# Patient Record
Sex: FEMALE | Race: BLACK OR AFRICAN AMERICAN | Employment: OTHER | URBAN - METROPOLITAN AREA
[De-identification: names, ages, dates, MRNs, and addresses within clinical notes are randomized per-mention and may not be internally consistent; named-entity substitution may affect disease eponyms.]

---

## 2019-02-22 ENCOUNTER — APPOINTMENT (OUTPATIENT)
Dept: CT IMAGING | Age: 79
DRG: 193 | End: 2019-02-22
Attending: EMERGENCY MEDICINE
Payer: MEDICARE

## 2019-02-22 ENCOUNTER — APPOINTMENT (OUTPATIENT)
Dept: NON INVASIVE DIAGNOSTICS | Age: 79
DRG: 193 | End: 2019-02-22
Attending: INTERNAL MEDICINE
Payer: MEDICARE

## 2019-02-22 ENCOUNTER — HOSPITAL ENCOUNTER (INPATIENT)
Age: 79
LOS: 8 days | Discharge: HOME HEALTH | DRG: 193 | End: 2019-03-02
Attending: EMERGENCY MEDICINE | Admitting: INTERNAL MEDICINE
Payer: MEDICARE

## 2019-02-22 ENCOUNTER — APPOINTMENT (OUTPATIENT)
Dept: GENERAL RADIOLOGY | Age: 79
DRG: 193 | End: 2019-02-22
Attending: STUDENT IN AN ORGANIZED HEALTH CARE EDUCATION/TRAINING PROGRAM
Payer: MEDICARE

## 2019-02-22 DIAGNOSIS — I48.20 CHRONIC A-FIB (HCC): ICD-10-CM

## 2019-02-22 DIAGNOSIS — E87.70 HYPERVOLEMIA, UNSPECIFIED HYPERVOLEMIA TYPE: ICD-10-CM

## 2019-02-22 DIAGNOSIS — I48.91 ATRIAL FIBRILLATION WITH RAPID VENTRICULAR RESPONSE (HCC): ICD-10-CM

## 2019-02-22 DIAGNOSIS — J96.01 ACUTE RESPIRATORY FAILURE WITH HYPOXIA (HCC): Primary | ICD-10-CM

## 2019-02-22 DIAGNOSIS — R60.9 PERIPHERAL EDEMA: ICD-10-CM

## 2019-02-22 DIAGNOSIS — J18.9 COMMUNITY ACQUIRED PNEUMONIA, UNSPECIFIED LATERALITY: ICD-10-CM

## 2019-02-22 PROBLEM — I50.9 CHF (CONGESTIVE HEART FAILURE) (HCC): Status: ACTIVE | Noted: 2019-02-22

## 2019-02-22 LAB
ALBUMIN SERPL-MCNC: 3.6 G/DL (ref 3.5–5)
ALBUMIN/GLOB SERPL: 0.8 {RATIO} (ref 1.1–2.2)
ALP SERPL-CCNC: 76 U/L (ref 45–117)
ALT SERPL-CCNC: 22 U/L (ref 12–78)
ANION GAP SERPL CALC-SCNC: 9 MMOL/L (ref 5–15)
AST SERPL-CCNC: 34 U/L (ref 15–37)
ATRIAL RATE: 88 BPM
BASOPHILS # BLD: 0 K/UL (ref 0–0.1)
BASOPHILS NFR BLD: 1 % (ref 0–1)
BILIRUB SERPL-MCNC: 1.7 MG/DL (ref 0.2–1)
BNP SERPL-MCNC: 1401 PG/ML
BUN SERPL-MCNC: 11 MG/DL (ref 6–20)
BUN/CREAT SERPL: 13 (ref 12–20)
CALCIUM SERPL-MCNC: 8.3 MG/DL (ref 8.5–10.1)
CALCULATED R AXIS, ECG10: 20 DEGREES
CALCULATED T AXIS, ECG11: 6 DEGREES
CHLORIDE SERPL-SCNC: 99 MMOL/L (ref 97–108)
CK MB CFR SERPL CALC: 0.9 % (ref 0–2.5)
CK MB SERPL-MCNC: 2 NG/ML (ref 5–25)
CK SERPL-CCNC: 232 U/L (ref 26–192)
CO2 SERPL-SCNC: 26 MMOL/L (ref 21–32)
COMMENT, HOLDF: NORMAL
CREAT SERPL-MCNC: 0.84 MG/DL (ref 0.55–1.02)
DIAGNOSIS, 93000: NORMAL
DIFFERENTIAL METHOD BLD: NORMAL
ECHO AO ROOT DIAM: 3.4 CM
ECHO AV AREA PEAK VELOCITY: 0.8 CM2
ECHO AV AREA VTI: 0.6 CM2
ECHO AV AREA/BSA PEAK VELOCITY: 0.4 CM2/M2
ECHO AV AREA/BSA VTI: 0.3 CM2/M2
ECHO AV MEAN GRADIENT: 15 MMHG
ECHO AV PEAK GRADIENT: 25.5 MMHG
ECHO AV PEAK VELOCITY: 252.65 CM/S
ECHO AV REGURGITANT PHT: 588.9 CM
ECHO AV VTI: 70.42 CM
ECHO EST RA PRESSURE: 10 MMHG
ECHO LV INTERNAL DIMENSION DIASTOLIC: 3.76 CM (ref 3.9–5.3)
ECHO LV INTERNAL DIMENSION SYSTOLIC: 3.08 CM
ECHO LV IVSD: 1.53 CM (ref 0.6–0.9)
ECHO LV MASS 2D: 232.8 G (ref 67–162)
ECHO LV MASS INDEX 2D: 111.6 G/M2 (ref 43–95)
ECHO LV POSTERIOR WALL DIASTOLIC: 1.33 CM (ref 0.6–0.9)
ECHO LV POSTERIOR WALL SYSTOLIC: 1.28 CM
ECHO LVOT DIAM: 1.64 CM
ECHO LVOT PEAK GRADIENT: 3.3 MMHG
ECHO LVOT PEAK VELOCITY: 90.64 CM/S
ECHO LVOT SV: 44.2 ML
ECHO LVOT VTI: 20.99 CM
ECHO MV A VELOCITY: 55.74 CM/S
ECHO MV AREA PHT: 3.6 CM2
ECHO MV E DECELERATION TIME (DT): 213.4 MS
ECHO MV E VELOCITY: 1.85 CM/S
ECHO MV E/A RATIO: 0.03
ECHO MV PRESSURE HALF TIME (PHT): 61.9 MS
ECHO PULMONARY ARTERY SYSTOLIC PRESSURE (PASP): 45.5 MMHG
ECHO RIGHT VENTRICULAR SYSTOLIC PRESSURE (RVSP): 45.5 MMHG
ECHO TV MAX VELOCITY: 280.46 CM/S
ECHO TV PEAK GRADIENT: 31.5 MMHG
ECHO TV REGURGITANT MAX VELOCITY: 298.11 CM/S
ECHO TV REGURGITANT PEAK GRADIENT: 35.5 MMHG
EOSINOPHIL # BLD: 0 K/UL (ref 0–0.4)
EOSINOPHIL NFR BLD: 0 % (ref 0–7)
ERYTHROCYTE [DISTWIDTH] IN BLOOD BY AUTOMATED COUNT: 14 % (ref 11.5–14.5)
GLOBULIN SER CALC-MCNC: 4.6 G/DL (ref 2–4)
GLUCOSE BLD STRIP.AUTO-MCNC: 152 MG/DL (ref 65–100)
GLUCOSE BLD STRIP.AUTO-MCNC: 190 MG/DL (ref 65–100)
GLUCOSE BLD STRIP.AUTO-MCNC: 220 MG/DL (ref 65–100)
GLUCOSE BLD STRIP.AUTO-MCNC: 237 MG/DL (ref 65–100)
GLUCOSE BLD STRIP.AUTO-MCNC: 243 MG/DL (ref 65–100)
GLUCOSE SERPL-MCNC: 232 MG/DL (ref 65–100)
HCT VFR BLD AUTO: 35.4 % (ref 35–47)
HGB BLD-MCNC: 11.7 G/DL (ref 11.5–16)
IMM GRANULOCYTES # BLD AUTO: 0 K/UL (ref 0–0.04)
IMM GRANULOCYTES NFR BLD AUTO: 0 % (ref 0–0.5)
LACTATE BLD-SCNC: 1.36 MMOL/L (ref 0.4–2)
LACTATE SERPL-SCNC: 1.5 MMOL/L (ref 0.4–2)
LYMPHOCYTES # BLD: 1.4 K/UL (ref 0.8–3.5)
LYMPHOCYTES NFR BLD: 21 % (ref 12–49)
MCH RBC QN AUTO: 30.2 PG (ref 26–34)
MCHC RBC AUTO-ENTMCNC: 33.1 G/DL (ref 30–36.5)
MCV RBC AUTO: 91.5 FL (ref 80–99)
MONOCYTES # BLD: 0.6 K/UL (ref 0–1)
MONOCYTES NFR BLD: 8 % (ref 5–13)
NEUTS SEG # BLD: 4.8 K/UL (ref 1.8–8)
NEUTS SEG NFR BLD: 70 % (ref 32–75)
NRBC # BLD: 0 K/UL (ref 0–0.01)
NRBC BLD-RTO: 0 PER 100 WBC
PISA AR MAX VEL: 393.61 CM/S
PLATELET # BLD AUTO: 191 K/UL (ref 150–400)
PMV BLD AUTO: 9.5 FL (ref 8.9–12.9)
POTASSIUM SERPL-SCNC: 3.4 MMOL/L (ref 3.5–5.1)
PROT SERPL-MCNC: 8.2 G/DL (ref 6.4–8.2)
Q-T INTERVAL, ECG07: 346 MS
QRS DURATION, ECG06: 88 MS
QTC CALCULATION (BEZET), ECG08: 474 MS
RBC # BLD AUTO: 3.87 M/UL (ref 3.8–5.2)
SAMPLES BEING HELD,HOLD: NORMAL
SERVICE CMNT-IMP: ABNORMAL
SODIUM SERPL-SCNC: 134 MMOL/L (ref 136–145)
TROPONIN I SERPL-MCNC: <0.05 NG/ML
TROPONIN I SERPL-MCNC: <0.05 NG/ML
TSH SERPL DL<=0.05 MIU/L-ACNC: 6.08 UIU/ML (ref 0.36–3.74)
VENTRICULAR RATE, ECG03: 113 BPM
WBC # BLD AUTO: 6.9 K/UL (ref 3.6–11)

## 2019-02-22 PROCEDURE — 83880 ASSAY OF NATRIURETIC PEPTIDE: CPT

## 2019-02-22 PROCEDURE — 74011250636 HC RX REV CODE- 250/636: Performed by: INTERNAL MEDICINE

## 2019-02-22 PROCEDURE — 82550 ASSAY OF CK (CPK): CPT

## 2019-02-22 PROCEDURE — 97161 PT EVAL LOW COMPLEX 20 MIN: CPT

## 2019-02-22 PROCEDURE — 74011636320 HC RX REV CODE- 636/320: Performed by: EMERGENCY MEDICINE

## 2019-02-22 PROCEDURE — 65660000000 HC RM CCU STEPDOWN

## 2019-02-22 PROCEDURE — 74011250637 HC RX REV CODE- 250/637: Performed by: INTERNAL MEDICINE

## 2019-02-22 PROCEDURE — 74011636637 HC RX REV CODE- 636/637: Performed by: INTERNAL MEDICINE

## 2019-02-22 PROCEDURE — 85025 COMPLETE CBC W/AUTO DIFF WBC: CPT

## 2019-02-22 PROCEDURE — 74011000250 HC RX REV CODE- 250: Performed by: STUDENT IN AN ORGANIZED HEALTH CARE EDUCATION/TRAINING PROGRAM

## 2019-02-22 PROCEDURE — 74011000250 HC RX REV CODE- 250: Performed by: INTERNAL MEDICINE

## 2019-02-22 PROCEDURE — 84484 ASSAY OF TROPONIN QUANT: CPT

## 2019-02-22 PROCEDURE — 36415 COLL VENOUS BLD VENIPUNCTURE: CPT

## 2019-02-22 PROCEDURE — 97530 THERAPEUTIC ACTIVITIES: CPT

## 2019-02-22 PROCEDURE — 96375 TX/PRO/DX INJ NEW DRUG ADDON: CPT

## 2019-02-22 PROCEDURE — 74011250636 HC RX REV CODE- 250/636: Performed by: STUDENT IN AN ORGANIZED HEALTH CARE EDUCATION/TRAINING PROGRAM

## 2019-02-22 PROCEDURE — 77010033678 HC OXYGEN DAILY

## 2019-02-22 PROCEDURE — 87070 CULTURE OTHR SPECIMN AEROBIC: CPT

## 2019-02-22 PROCEDURE — 74011000258 HC RX REV CODE- 258: Performed by: INTERNAL MEDICINE

## 2019-02-22 PROCEDURE — 93005 ELECTROCARDIOGRAM TRACING: CPT

## 2019-02-22 PROCEDURE — 83605 ASSAY OF LACTIC ACID: CPT

## 2019-02-22 PROCEDURE — 96374 THER/PROPH/DIAG INJ IV PUSH: CPT

## 2019-02-22 PROCEDURE — 93306 TTE W/DOPPLER COMPLETE: CPT

## 2019-02-22 PROCEDURE — 77030038269 HC DRN EXT URIN PURWCK BARD -A

## 2019-02-22 PROCEDURE — 82553 CREATINE MB FRACTION: CPT

## 2019-02-22 PROCEDURE — 80053 COMPREHEN METABOLIC PANEL: CPT

## 2019-02-22 PROCEDURE — 71275 CT ANGIOGRAPHY CHEST: CPT

## 2019-02-22 PROCEDURE — 82962 GLUCOSE BLOOD TEST: CPT

## 2019-02-22 PROCEDURE — 71045 X-RAY EXAM CHEST 1 VIEW: CPT

## 2019-02-22 PROCEDURE — 84443 ASSAY THYROID STIM HORMONE: CPT

## 2019-02-22 PROCEDURE — 99284 EMERGENCY DEPT VISIT MOD MDM: CPT

## 2019-02-22 PROCEDURE — 87040 BLOOD CULTURE FOR BACTERIA: CPT

## 2019-02-22 PROCEDURE — 74011250636 HC RX REV CODE- 250/636: Performed by: EMERGENCY MEDICINE

## 2019-02-22 PROCEDURE — 94761 N-INVAS EAR/PLS OXIMETRY MLT: CPT

## 2019-02-22 RX ORDER — SODIUM CHLORIDE 0.9 % (FLUSH) 0.9 %
10 SYRINGE (ML) INJECTION
Status: COMPLETED | OUTPATIENT
Start: 2019-02-22 | End: 2019-02-22

## 2019-02-22 RX ORDER — SODIUM CHLORIDE 0.9 % (FLUSH) 0.9 %
5-40 SYRINGE (ML) INJECTION EVERY 8 HOURS
Status: DISCONTINUED | OUTPATIENT
Start: 2019-02-22 | End: 2019-03-02 | Stop reason: HOSPADM

## 2019-02-22 RX ORDER — BISACODYL 5 MG
5 TABLET, DELAYED RELEASE (ENTERIC COATED) ORAL DAILY PRN
Status: DISCONTINUED | OUTPATIENT
Start: 2019-02-22 | End: 2019-03-02 | Stop reason: HOSPADM

## 2019-02-22 RX ORDER — ENOXAPARIN SODIUM 100 MG/ML
40 INJECTION SUBCUTANEOUS EVERY 24 HOURS
Status: DISCONTINUED | OUTPATIENT
Start: 2019-02-23 | End: 2019-02-22

## 2019-02-22 RX ORDER — AMLODIPINE BESYLATE 5 MG/1
5 TABLET ORAL DAILY
COMMUNITY
End: 2019-03-02

## 2019-02-22 RX ORDER — ACETAMINOPHEN 500 MG
2000 TABLET ORAL DAILY
COMMUNITY

## 2019-02-22 RX ORDER — FUROSEMIDE 10 MG/ML
40 INJECTION INTRAMUSCULAR; INTRAVENOUS
Status: COMPLETED | OUTPATIENT
Start: 2019-02-22 | End: 2019-02-22

## 2019-02-22 RX ORDER — FUROSEMIDE 10 MG/ML
40 INJECTION INTRAMUSCULAR; INTRAVENOUS DAILY
Status: DISCONTINUED | OUTPATIENT
Start: 2019-02-23 | End: 2019-02-23

## 2019-02-22 RX ORDER — METOPROLOL TARTRATE 50 MG/1
75 TABLET ORAL 2 TIMES DAILY
COMMUNITY
End: 2019-03-02

## 2019-02-22 RX ORDER — LOSARTAN POTASSIUM 100 MG/1
100 TABLET ORAL DAILY
Status: DISCONTINUED | OUTPATIENT
Start: 2019-02-22 | End: 2019-03-02 | Stop reason: HOSPADM

## 2019-02-22 RX ORDER — ATORVASTATIN CALCIUM 10 MG/1
40 TABLET, FILM COATED ORAL EVERY OTHER DAY
Status: DISCONTINUED | OUTPATIENT
Start: 2019-02-23 | End: 2019-03-02 | Stop reason: HOSPADM

## 2019-02-22 RX ORDER — ASPIRIN 81 MG/1
81 TABLET ORAL DAILY
Status: DISCONTINUED | OUTPATIENT
Start: 2019-02-22 | End: 2019-03-02 | Stop reason: HOSPADM

## 2019-02-22 RX ORDER — GUANFACINE HYDROCHLORIDE 1 MG/1
2 TABLET ORAL
Status: DISCONTINUED | OUTPATIENT
Start: 2019-02-22 | End: 2019-03-02 | Stop reason: HOSPADM

## 2019-02-22 RX ORDER — PANTOPRAZOLE SODIUM 40 MG/1
40 TABLET, DELAYED RELEASE ORAL
Status: DISCONTINUED | OUTPATIENT
Start: 2019-02-22 | End: 2019-03-02 | Stop reason: HOSPADM

## 2019-02-22 RX ORDER — LEVOTHYROXINE SODIUM 125 UG/1
125 TABLET ORAL
Status: DISCONTINUED | OUTPATIENT
Start: 2019-02-22 | End: 2019-03-02 | Stop reason: HOSPADM

## 2019-02-22 RX ORDER — MAGNESIUM SULFATE 100 %
4 CRYSTALS MISCELLANEOUS AS NEEDED
Status: DISCONTINUED | OUTPATIENT
Start: 2019-02-22 | End: 2019-03-02 | Stop reason: HOSPADM

## 2019-02-22 RX ORDER — ACETAMINOPHEN 325 MG/1
650 TABLET ORAL
Status: DISCONTINUED | OUTPATIENT
Start: 2019-02-22 | End: 2019-03-02 | Stop reason: HOSPADM

## 2019-02-22 RX ORDER — METOPROLOL TARTRATE 5 MG/5ML
INJECTION INTRAVENOUS
Status: DISPENSED
Start: 2019-02-22 | End: 2019-02-22

## 2019-02-22 RX ORDER — FUROSEMIDE 40 MG/1
40 TABLET ORAL DAILY
COMMUNITY

## 2019-02-22 RX ORDER — ONDANSETRON 2 MG/ML
4 INJECTION INTRAMUSCULAR; INTRAVENOUS
Status: DISCONTINUED | OUTPATIENT
Start: 2019-02-22 | End: 2019-03-02 | Stop reason: HOSPADM

## 2019-02-22 RX ORDER — METOPROLOL TARTRATE 5 MG/5ML
5 INJECTION INTRAVENOUS ONCE
Status: COMPLETED | OUTPATIENT
Start: 2019-02-22 | End: 2019-02-22

## 2019-02-22 RX ORDER — MULTIVITAMIN
1 TABLET ORAL 2 TIMES DAILY
COMMUNITY

## 2019-02-22 RX ORDER — CLONIDINE HYDROCHLORIDE 0.1 MG/1
0.15 TABLET ORAL 2 TIMES DAILY
COMMUNITY
End: 2019-03-02

## 2019-02-22 RX ORDER — ENOXAPARIN SODIUM 100 MG/ML
1 INJECTION SUBCUTANEOUS
Status: COMPLETED | OUTPATIENT
Start: 2019-02-22 | End: 2019-02-22

## 2019-02-22 RX ORDER — HYDROCODONE BITARTRATE AND ACETAMINOPHEN 5; 325 MG/1; MG/1
0.5 TABLET ORAL
Status: DISCONTINUED | OUTPATIENT
Start: 2019-02-22 | End: 2019-03-02 | Stop reason: HOSPADM

## 2019-02-22 RX ORDER — ASPIRIN 325 MG
325 TABLET ORAL DAILY
COMMUNITY

## 2019-02-22 RX ORDER — INSULIN LISPRO 100 [IU]/ML
INJECTION, SOLUTION INTRAVENOUS; SUBCUTANEOUS
Status: DISCONTINUED | OUTPATIENT
Start: 2019-02-22 | End: 2019-03-02 | Stop reason: HOSPADM

## 2019-02-22 RX ORDER — SODIUM CHLORIDE 0.9 % (FLUSH) 0.9 %
5-40 SYRINGE (ML) INJECTION AS NEEDED
Status: DISCONTINUED | OUTPATIENT
Start: 2019-02-22 | End: 2019-03-02 | Stop reason: HOSPADM

## 2019-02-22 RX ORDER — PRAVASTATIN SODIUM 80 MG/1
80 TABLET ORAL
COMMUNITY

## 2019-02-22 RX ORDER — POTASSIUM CHLORIDE 750 MG/1
40 TABLET, FILM COATED, EXTENDED RELEASE ORAL ONCE
Status: COMPLETED | OUTPATIENT
Start: 2019-02-22 | End: 2019-02-22

## 2019-02-22 RX ORDER — DEXTROSE 50 % IN WATER (D50W) INTRAVENOUS SYRINGE
25-50 AS NEEDED
Status: DISCONTINUED | OUTPATIENT
Start: 2019-02-22 | End: 2019-03-02 | Stop reason: HOSPADM

## 2019-02-22 RX ADMIN — CEFTRIAXONE SODIUM 1 G: 1 INJECTION, POWDER, FOR SOLUTION INTRAMUSCULAR; INTRAVENOUS at 04:50

## 2019-02-22 RX ADMIN — AZITHROMYCIN MONOHYDRATE 500 MG: 500 INJECTION, POWDER, LYOPHILIZED, FOR SOLUTION INTRAVENOUS at 05:56

## 2019-02-22 RX ADMIN — Medication 10 ML: at 02:57

## 2019-02-22 RX ADMIN — LEVOTHYROXINE SODIUM 125 MCG: 125 TABLET ORAL at 08:34

## 2019-02-22 RX ADMIN — Medication 10 ML: at 08:39

## 2019-02-22 RX ADMIN — DILTIAZEM HYDROCHLORIDE 10 MG/HR: 5 INJECTION INTRAVENOUS at 19:53

## 2019-02-22 RX ADMIN — METOPROLOL TARTRATE 5 MG: 5 INJECTION INTRAVENOUS at 01:18

## 2019-02-22 RX ADMIN — INSULIN LISPRO 2 UNITS: 100 INJECTION, SOLUTION INTRAVENOUS; SUBCUTANEOUS at 22:38

## 2019-02-22 RX ADMIN — FUROSEMIDE 40 MG: 10 INJECTION, SOLUTION INTRAMUSCULAR; INTRAVENOUS at 02:45

## 2019-02-22 RX ADMIN — IOPAMIDOL 100 ML: 755 INJECTION, SOLUTION INTRAVENOUS at 02:57

## 2019-02-22 RX ADMIN — INSULIN LISPRO 2 UNITS: 100 INJECTION, SOLUTION INTRAVENOUS; SUBCUTANEOUS at 18:23

## 2019-02-22 RX ADMIN — INSULIN LISPRO 3 UNITS: 100 INJECTION, SOLUTION INTRAVENOUS; SUBCUTANEOUS at 13:23

## 2019-02-22 RX ADMIN — LOSARTAN POTASSIUM 100 MG: 100 TABLET, FILM COATED ORAL at 08:34

## 2019-02-22 RX ADMIN — ENOXAPARIN SODIUM 100 MG: 100 INJECTION SUBCUTANEOUS at 06:41

## 2019-02-22 RX ADMIN — POTASSIUM CHLORIDE 40 MEQ: 750 TABLET, EXTENDED RELEASE ORAL at 04:48

## 2019-02-22 RX ADMIN — INSULIN LISPRO 3 UNITS: 100 INJECTION, SOLUTION INTRAVENOUS; SUBCUTANEOUS at 08:35

## 2019-02-22 RX ADMIN — PANTOPRAZOLE SODIUM 40 MG: 40 TABLET, DELAYED RELEASE ORAL at 08:34

## 2019-02-22 RX ADMIN — DILTIAZEM HYDROCHLORIDE 2.5 MG/HR: 5 INJECTION INTRAVENOUS at 06:35

## 2019-02-22 RX ADMIN — ASPIRIN 81 MG: 81 TABLET, COATED ORAL at 10:22

## 2019-02-22 RX ADMIN — Medication 10 ML: at 22:39

## 2019-02-22 RX ADMIN — GUANFACINE HYDROCHLORIDE 2 MG: 1 TABLET ORAL at 22:38

## 2019-02-22 NOTE — H&P
Hospitalist Admission NoteNAME: Yemi :  1940 MRN:  [id number] Date/Time:  2019 5:35 AM 
 
Patient PCP: Unknown, Provider 
______________________________________________________________________ Given the patient's current clinical presentation, I have a high level of concern for decompensation if discharged from the emergency department. Complex decision making was performed, which includes reviewing the patient's available past medical records, laboratory results, and x-ray films. My assessment of this patient's clinical condition and my plan of care is as follows. Assessment / Plan: 
 
Acute hypoxic respiratory failure most likely secondary to CHF exacerbation and superimposed community-acquired pneumonia Admit patient to telemetry unit [facility] Daily weight Start patient on IV antibiotic azithromycin and Rocephin Follow-up sputum culture Start patient on Lasix 40 mg IV daily Echocardiogram 
Cardiology consultation Follow-up serial troponin DM Hold oral hypoglycemic medication start patient sliding scale with insulin Hypertension Continue home antihypertensive medication Cozaar 100 mg daily Hypothyroidism Continue Synthyroid 125 mcg daily Hyperlipidemia Continue simvastatin 80 mg daily AF with RVR  
-give one dose Lovenox  
-cardio consult  
-start on Cardizem drip Code Status: Full Surrogate Decision Maker: 
Abdullahi Fernández DVT Prophylaxis: Lovenox GI Prophylaxis: not indicated Baseline: independent Subjective: CHIEF COMPLAINT: SOB HISTORY OF PRESENT ILLNESS:    
 
77years old female from home with past medical history significant for DM, hypertension, hypothyroidism, atrial fibrillation presented to the hospital complaining from worsening shortness of breath started about 2 weeks ago associated with a productive cough with brownish colored sputum associated with worsening bilateral leg swelling, patient denies any fever but states that she feels chills, denies any chest pain CT chest was done on show bilateral pneumonia and reactive mediastinal lymphadenopathy and  mild interstitial edema, 1 dose 40 mg Lasix was given in ED and  Rocephin and azithromycin was given. We were asked to admit for work up and evaluation of the above problems. Past Medical History:  
Diagnosis Date  Diabetes ([address]  Hypercholesteremia  Hypertension  Thyroid condition Past Surgical History:  
Procedure Laterality Date  HX HYSTERECTOMY Social History Tobacco Use  Smoking status: Never Smoker Substance Use Topics  Alcohol use: No  
  
 
No family history on file. Allergies Allergen Reactions  Lisinopril Swelling Prior to Admission medications Medication Sig Start Date End Date Taking? Authorizing Provider  
metFORMIN (GLUCOPHAGE) 850 mg tablet Take 850 mg by mouth two (2) times daily (with meals). Dick Conroy MD  
levothyroxine (SYNTHROID) 125 mcg tablet Take 125 mcg by mouth Daily (before breakfast). Dick Conroy MD  
losartan (COZAAR) 100 mg tablet Take 100 mg by mouth daily. Dick Conroy MD  
guanFACINE 2 mg Tab Take 2 mg by mouth nightly. Dick Conroy MD  
glimepiride (AMARYL) 4 mg tablet Take 4 mg by mouth nightly. Dick Conroy MD  
simvastatin (ZOCOR) 80 mg tablet Take 80 mg by mouth every other day. Dick Conroy MD  
HYDROcodone-acetaminophen (LORTAB) 2.5-500 mg per tablet Take 1 Tab by mouth every six (6) hours as needed for Pain. 2/16/13   Loyd Das MD  
aspirin 81 mg CpDR Take 81 mg by mouth daily. 2/16/13   Loyd Das MD  
omeprazole (PRILOSEC) 20 mg capsule Take 1 Cap by mouth daily. 3/14/12   Dora Walls MD  
 
 
REVIEW OF SYSTEMS:    
I am not able to complete the review of systems because: The patient is intubated and sedated The patient has altered mental status due to his acute medical problems The patient has baseline aphasia from prior stroke(s) The patient has baseline dementia and is not reliable historian The patient is in acute medical distress and unable to provide information Total of 12 systems reviewed as follows:   
   POSITIVE= underlined text  Negative = text not underlined General:  fever, chills, sweats, generalized weakness, weight loss/gain,  
   loss of appetite Eyes:    blurred vision, eye pain, loss of vision, double vision ENT:    rhinorrhea, pharyngitis Respiratory:   cough, sputum production, SOB, STARR, wheezing, pleuritic pain  
Cardiology:   chest pain, palpitations, orthopnea, PND, edema, syncope Gastrointestinal:  abdominal pain , N/V, diarrhea, dysphagia, constipation, bleeding Genitourinary:  frequency, urgency, dysuria, hematuria, incontinence Muskuloskeletal :  arthralgia, myalgia, back pain Hematology:  easy bruising, nose or gum bleeding, lymphadenopathy Dermatological: rash, ulceration, pruritis, color change / jaundice Endocrine:   hot flashes or polydipsia Neurological:  headache, dizziness, confusion, focal weakness, paresthesia, Speech difficulties, memory loss, gait difficulty Psychological: Feelings of anxiety, depression, agitation Objective: VITALS:   
Visit Vitals /88 Pulse (!) 101 Temp 98.5 °F (36.9 °C) Resp 20 Ht 5' 8\" (1.727 m) Wt 95.3 kg (210 lb) SpO2 94% BMI 31.93 kg/m² PHYSICAL EXAM: 
 
General:    Alert, cooperative, no distress, appears stated age. HEENT: Atraumatic, anicteric sclerae, pink conjunctivae No oral ulcers, mucosa moist, throat clear, dentition fair Neck:  Supple, symmetrical,  thyroid: non tender Lungs:   B/l  Rhonchi. Chest wall:  No tenderness  No Accessory muscle use. Heart:   IRRegular  rhythm,  No  murmur   B/l edema Abdomen:   Soft, non-tender. Not distended. Bowel sounds normal 
Extremities: No cyanosis. No clubbing,   
  Skin turgor normal, Capillary refill normal, Radial dial pulse 2+ Skin:     Not pale. Not Jaundiced  No rashes Psych:  Good insight. Not depressed. Not anxious or agitated. Neurologic: EOMs intact. No facial asymmetry. No aphasia or slurred speech. Symmetrical strength, Sensation grossly intact. Alert and oriented X 4.  
 
_______________________________________________________________________ Care Plan discussed with: 
  Comments Patient y Family RN y   
Care Manager Consultant:     
_______________________________________________________________________ Expected  Disposition:  
Home with Family y HH/PT/OT/RN   
SNF/LTC   
SHAYY   
________________________________________________________________________ TOTAL TIME:  70  Minutes Critical Care Provided     Minutes non procedure based Comments  
 y Reviewed previous records  
>50% of visit spent in counseling and coordination of care y Discussion with patient and/or family and questions answered 
  
 
________________________________________________________________________ Signed: Alejandra Frank MD 
 
Procedures: see electronic medical records for all procedures/Xrays and details which were not copied into this note but were reviewed prior to creation of Plan. LAB DATA REVIEWED:   
Recent Results (from the past 24 hour(s)) EKG, 12 LEAD, INITIAL Collection Time: 02/22/19 12:57 AM  
Result Value Ref Range Ventricular Rate 113 BPM  
 Atrial Rate 88 BPM  
 QRS Duration 88 ms Q-T Interval 346 ms  
 QTC Calculation (Bezet) 474 ms Calculated R Axis 20 degrees Calculated T Axis 6 degrees Diagnosis Atrial fibrillation with rapid ventricular response Low voltage QRS Cannot rule out Anterior infarct , age undetermined When compared with ECG of 16-FEB-2013 11:22, 
 Atrial fibrillation has replaced Sinus rhythm Vent. rate has increased BY  45 BPM 
Minimal criteria for Anterior infarct are now present CBC WITH AUTOMATED DIFF Collection Time: 02/22/19  1:16 AM  
Result Value Ref Range WBC 6.9 3.6 - 11.0 K/uL  
 RBC 3.87 3.80 - 5.20 M/uL  
 HGB 11.7 11.5 - 16.0 g/dL HCT 35.4 35.0 - 47.0 % MCV 91.5 80.0 - 99.0 FL  
 MCH 30.2 26.0 - 34.0 PG  
 MCHC 33.1 30.0 - 36.5 g/dL  
 RDW 14.0 11.5 - 14.5 % PLATELET 310 997 - 951 K/uL MPV 9.5 8.9 - 12.9 FL  
 NRBC 0.0 0  WBC ABSOLUTE NRBC 0.00 0.00 - 0.01 K/uL NEUTROPHILS 70 32 - 75 % LYMPHOCYTES 21 12 - 49 % MONOCYTES 8 5 - 13 % EOSINOPHILS 0 0 - 7 % BASOPHILS 1 0 - 1 % IMMATURE GRANULOCYTES 0 0.0 - 0.5 % ABS. NEUTROPHILS 4.8 1.8 - 8.0 K/UL  
 ABS. LYMPHOCYTES 1.4 0.8 - 3.5 K/UL  
 ABS. MONOCYTES 0.6 0.0 - 1.0 K/UL  
 ABS. EOSINOPHILS 0.0 0.0 - 0.4 K/UL  
 ABS. BASOPHILS 0.0 0.0 - 0.1 K/UL  
 ABS. IMM. GRANS. 0.0 0.00 - 0.04 K/UL  
 DF AUTOMATED METABOLIC PANEL, COMPREHENSIVE Collection Time: 02/22/19  1:16 AM  
Result Value Ref Range Sodium 134 (L) 136 - 145 mmol/L Potassium 3.4 (L) 3.5 - 5.1 mmol/L Chloride 99 97 - 108 mmol/L  
 CO2 26 21 - 32 mmol/L Anion gap 9 5 - 15 mmol/L Glucose 232 (H) 65 - 100 mg/dL BUN 11 6 - 20 MG/DL Creatinine 0.84 0.55 - 1.02 MG/DL  
 BUN/Creatinine ratio 13 12 - 20 GFR est AA >60 >60 ml/min/1.73m2 GFR est non-AA >60 >60 ml/min/1.73m2 Calcium 8.3 (L) 8.5 - 10.1 MG/DL Bilirubin, total 1.7 (H) 0.2 - 1.0 MG/DL  
 ALT (SGPT) 22 12 - 78 U/L  
 AST (SGOT) 34 15 - 37 U/L Alk. phosphatase 76 45 - 117 U/L Protein, total 8.2 6.4 - 8.2 g/dL Albumin 3.6 3.5 - 5.0 g/dL Globulin 4.6 (H) 2.0 - 4.0 g/dL A-G Ratio 0.8 (L) 1.1 - 2.2 CK W/ REFLX CKMB Collection Time: 02/22/19  1:16 AM  
Result Value Ref Range  (H) 26 - 192 U/L  
TROPONIN I Collection Time: 02/22/19  1:16 AM  
Result Value Ref Range Troponin-I, Qt. <0.05 <0.05 ng/mL TSH 3RD GENERATION Collection Time: 02/22/19  1:16 AM  
Result Value Ref Range TSH 6.08 (H) 0.36 - 3.74 uIU/mL SAMPLES BEING HELD Collection Time: 02/22/19  1:16 AM  
Result Value Ref Range SAMPLES BEING HELD BL   
 COMMENT Add-on orders for these samples will be processed based on acceptable specimen integrity and analyte stability, which may vary by analyte. NT-PRO BNP Collection Time: 02/22/19  1:16 AM  
Result Value Ref Range NT pro-BNP 1,401 (H) <450 PG/ML  
CK-MB,QUANT. Collection Time: 02/22/19  1:16 AM  
Result Value Ref Range CK - MB 2.0 <3.6 NG/ML  
 CK-MB Index 0.9 0.0 - 2.5

## 2019-02-22 NOTE — ED NOTES
Report received from off going nurse; assumed care. All tasks and orders completed to this point (per report).

## 2019-02-22 NOTE — PROGRESS NOTES
Problem: Pressure Injury - Risk of 
Goal: *Prevention of pressure injury Document Fabricio Scale and appropriate interventions in the flowsheet. Outcome: Progressing Towards Goal 
Pressure Injury Interventions: 
Sensory Interventions: Assess changes in LOC, Discuss PT/OT consult with provider, Keep linens dry and wrinkle-free, Turn and reposition approx. every two hours (pillows and wedges if needed) Moisture Interventions: Absorbent underpads, Check for incontinence Q2 hours and as needed, Limit adult briefs Activity Interventions: Increase time out of bed, PT/OT evaluation Mobility Interventions: HOB 30 degrees or less, PT/OT evaluation, Turn and reposition approx. every two hours(pillow and wedges) Nutrition Interventions: Document food/fluid/supplement intake Friction and Shear Interventions: HOB 30 degrees or less, Sit at 90-degree angle

## 2019-02-22 NOTE — ED NOTES
Transport in pt room attempting to transport pt to Echo, echo tech asked to complete bedside for patient due to cardizem drip.

## 2019-02-22 NOTE — PROGRESS NOTES
Problem: Mobility Impaired (Adult and Pediatric) Goal: *Acute Goals and Plan of Care (Insert Text) Physical Therapy Goals Initiated 2/22/2019 1. Patient will move from supine to sit and sit to supine , scoot up and down and roll side to side in bed with independence within 7 day(s). 2.  Patient will transfer from bed to chair and chair to bed with independence using the least restrictive device within 7 day(s). 3.  Patient will perform sit to stand with independence within 7 day(s). 4.  Patient will ambulate with modified independence for 150 feet with the least restrictive device within 7 day(s). 5.  Patient will ascend/descend 5 stairs with B handrail(s) with minimal assistance/contact guard assist within 7 day(s). physical Therapy EVALUATION Patient: Vivian Foreman (77 y.o. female) Date: 2/22/2019 Primary Diagnosis: CHF (congestive heart failure) ([facility] [address] [I50.9] Precautions: fall, 2L at eval, states none at home ASSESSMENT : 
Based on the objective data described below, the patient presents with limitations in gait, functional mobility and activity tolerance. Cleared for session by RN. Pt lives with son and has multiple family members around, stating \"there is always someone there with me\". She states she does her own bathing at sink and dresses herself. She states she doesn't walk well but that she uses a RW. At this time, pt is received sitting at EOB. She is able to stand and transfer to and from [facility] with RW with CGA. She completes own hygiene with guarding for stand. She is able to amb a short distance by bed with RW limited by IV at this time (no pole available at the time) requiring CGA. No c/o during activity. Pt left sitting at EOB with call bell in reach. Pt on 2L with VSS during session. Rounded with RN and encouraged pt to call for assist to get up and complete mobility. Pt voiced understanding. Discussed possibility of HHPT with pt.  At this time, pt states she does not see need for it. She will consider depending upon progress. Patient will benefit from skilled intervention to address the above impairments. Patients rehabilitation potential is considered to be Good Factors which may influence rehabilitation potential include:  
[]         None noted 
[]         Mental ability/status [x]         Medical condition 
[]         Home/family situation and support systems 
[]         Safety awareness 
[]         Pain tolerance/management 
[]         Other: PLAN : 
Recommendations and Planned Interventions: 
[x]           Bed Mobility Training             []    Neuromuscular Re-Education 
[x]           Transfer Training                   []    Orthotic/Prosthetic Training 
[x]           Gait Training                         []    Modalities [x]           Therapeutic Exercises           []    Edema Management/Control 
[x]           Therapeutic Activities            [x]    Patient and Family Training/Education 
[]           Other (comment): Frequency/Duration: Patient will be followed by physical therapy  4 times a week to address goals. Discharge Recommendations: Home Health, pt not yet open to it Further Equipment Recommendations for Discharge: has RW/cane SUBJECTIVE:  
Patient stated I don't walk very well.  OBJECTIVE DATA SUMMARY:  
HISTORY:   
Past Medical History:  
Diagnosis Date  Diabetes ([facility] [address]  Hypercholesteremia  Hypertension  Thyroid condition Past Surgical History:  
Procedure Laterality Date  HX HYSTERECTOMY Prior Level of Function/Home Situation: Pt lives with son and has multiple family members around, stating \"there is always someone there with me\". She states she does her own bathing at sink and dresses herself. She states she doesn't walk well but that she uses a RW. Home Situation Home Environment: Private residence # Steps to Enter: 5 Rails to Enter: Yes Hand Rails : Bilateral 
 One/Two Story Residence: One story Living Alone: No 
Support Systems: Child(tigre), Family member(s) Patient Expects to be Discharged to[de-identified] Private residence Current DME Used/Available at Home: Cane, straight, Walker, rolling Tub or Shower Type: Tub/Shower combination EXAMINATION/PRESENTATION/DECISION MAKING: Critical Behavior: 
Neurologic State: Alert Orientation Level: Oriented to person, Oriented to place, Oriented to situation, Oriented to time(general time) Cognition: Follows commands Hearing: Auditory Auditory Impairment: None Range Of Motion: 
AROM: Within functional limits PROM: Within functional limits Strength:   
Strength: Generally decreased, functional 
  
  
  
  
  
  
Tone & Sensation:  
  
  
  
  
  
Sensation: Intact Coordination: 
Coordination: Within functional limits Functional Mobility: 
Bed Mobility: 
  
Supine to Sit: Independent Transfers: 
Sit to Stand: Contact guard assistance Stand to Sit: Contact guard assistance Bed to Chair: Contact guard assistance Balance:  
Sitting: Intact Standing: Impaired Standing - Static: Fair Standing - Dynamic : FairAmbulation/Gait Training:Distance (ft): 5 Feet (ft) Assistive Device: Walker, rolling;Gait belt Ambulation - Level of Assistance: Contact guard assistance Gait Description (WDL): (few steps forward and back only d/t limits of IV) Gait Abnormalities: Decreased step clearance Speed/Liv: Slow Step Length: Right shortened;Left shortened Functional Measure: 
Barthel Index: 
 
Bathin(sponge bath) Bladder: 5 Bowels: 10 
Groomin Dressin Feeding: 10 Mobility: 0 Stairs: 0 Toilet Use: 5 Transfer (Bed to Chair and Back): 10 Total: 55 The Barthel ADL Index: Guidelines 1. The index should be used as a record of what a patient does, not as a record of what a patient could do. 2. The main aim is to establish degree of independence from any help, physical or verbal, however minor and for whatever reason. 3. The need for supervision renders the patient not independent. 4. A patient's performance should be established using the best available evidence. Asking the patient, friends/relatives and nurses are the usual sources, but direct observation and common sense are also important. However direct testing is not needed. 5. Usually the patient's performance over the preceding 24-48 hours is important, but occasionally longer periods will be relevant. 6. Middle categories imply that the patient supplies over 50 per cent of the effort. 7. Use of aids to be independent is allowed. Guzman Siddiqi., Barthel, DYoungW. (4273). Functional evaluation: the Barthel Index. [address] [facility] (14)2. IRISH Garcia, Ricardo Ivory.Vivi., [place], [facility] (1999). Measuring the change indisability after inpatient rehabilitation; comparison of the responsiveness of the Barthel Index and Functional [place] Measure. Journal of Neurology, Neurosurgery, and Psychiatry, 66(4), 811-044. Radha Carreno, N.J.A, YENI Meade, & Servando Cruz, M.A. (2004.) Assessment of post-stroke quality of life in cost-effectiveness studies: The usefulness of the Barthel Index and the EuroQoL-5D. [facility], 13, [phone] Physical Therapy Evaluation Charge Determination History Examination Presentation Decision-Making HIGH Complexity :3+ comorbidities / personal factors will impact the outcome/ POC  MEDIUM Complexity : 3 Standardized tests and measures addressing body structure, function, activity limitation and / or participation in recreation  MEDIUM Complexity : Evolving with changing characteristics  LOW Complexity : FOTO score of  Based on the above components, the patient evaluation is determined to be of the following complexity level: LOW Pain: Pain Scale 1: Numeric (0 - 10) Pain Intensity 1: 0 Activity Tolerance:  
No c/o during session; no acute distress Please refer to the flowsheet for vital signs taken during this treatment. After treatment:  
[]         Patient left in no apparent distress sitting up in chair 
[x]         Patient left in no apparent distress in bed 
[x]         Call bell left within reach [x]         Nursing notified 
[]         Caregiver present 
[]         Bed alarm activated COMMUNICATION/EDUCATION:  
The patients plan of care was discussed with: Registered Nurse. [x]         Fall prevention education was provided and the patient/caregiver indicated understanding. [x]         Patient/family have participated as able in goal setting and plan of care. [x]         Patient/family agree to work toward stated goals and plan of care. []         Patient understands intent and goals of therapy, but is neutral about his/her participation. []         Patient is unable to participate in goal setting and plan of care. Thank you for this referral. 
Odalis Galicia, PT Time Calculation: 29 mins

## 2019-02-22 NOTE — PROGRESS NOTES
Patient seen and evaluated Admitted for acute hypoxic resp failure CAP, ? CHF Cont Kina romero with abx Cont current outpatient regimen for chronic medical issues

## 2019-02-22 NOTE — CONSULTS
Consult/Admission    NAME: Tori Delgado   :  1940   MRN:  [id number]     Date/Time:  2019 9:28 AM    Patient PCP: Unknown, Provider  ________________________________________________________________________     Assessment:     Admitted with pneumonia -  Primary diagnosis. Mild pulmonary edema associated. Chronic Atrial Fibrillation . peripheral edema. Diabetes   HTN   Hyperlipidemia. Elevated TSH   ? Hypothyroid. Her echo in 2018 from [facility], [facility] showed normal LV fx. She has no hx of CHF           Plan:     Treatment of pneumonia per Hospitalist.   Diuresis for edema   Will review echo when done. Thyroid per Hospitalist.         [x]           High complexity decision making was performed        Subjective:   CHIEF COMPLAINT:  SOB , coughing , leg edema. HISTORY OF PRESENT ILLNESS:     Shamar Iglesias is a 77 y.o.  female who has had a couple weeks of increasing coughing and SOB . Leg edema . Not compliant with her lasix. She has no hx of CHF or CAD . Her LV fx has been normal by previous Echo . CXR and CT reviewed . Reports bilateral infiltrates . WBC normal     TSH is elevated. Creatinine is normal .  Trop normal.            We were asked to consult for evaluation of the above problems. Past Medical History:   Diagnosis Date    Diabetes ([address]     Hypercholesteremia     Hypertension     Thyroid condition       Past Surgical History:   Procedure Laterality Date    HX HYSTERECTOMY       Allergies   Allergen Reactions    Lisinopril Swelling      Meds:  See below  Social History     Tobacco Use    Smoking status: Never Smoker   Substance Use Topics    Alcohol use: No      No family history on file.     REVIEW OF SYSTEMS:     []            Unable to obtain  ROS due to ---   [x]            Total of 12 systems reviewed as follows:    Constitutional: negative fever, negative chills, negative weight loss  Eyes:   negative visual changes  ENT:   negative sore throat, tongue or lip swelling  Respiratory:  negative cough, negative dyspnea  Cards:  negative for chest pain, palpitations, lower extremity edema  GI:   negative for nausea, vomiting, diarrhea, and abdominal pain  Genitourinary: negative for frequency, dysuria  Integument:  negative for rash   Hematologic:  negative for easy bruising and gum/nose bleeding  Musculoskel: negative for myalgias,  back pain  Neurological:  negative for headaches, dizziness, vertigo, weakness  Behavl/Psych: negative for feelings of anxiety, depression     Pertinent Positives include :    Objective:      Physical Exam:    Last 24hrs VS reviewed since prior progress note. Most recent are:    Visit Vitals  /76 (BP 1 Location: Left arm, BP Patient Position: At rest)   Pulse 78   Temp 99.7 °F (37.6 °C)   Resp 13   Ht 5' 8\" (1.727 m)   Wt 95.3 kg (210 lb)   SpO2 94%   BMI 31.93 kg/m²     No intake or output data in the 24 hours ending 02/22/19 0928     General Appearance: Well developed, well nourished, alert & oriented x 3,    no acute distress. Ears/Nose/Mouth/Throat: Pupils equal and round, Hearing grossly normal.  Neck: Supple. JVP within normal limits. Carotids good upstrokes, with no bruit. Chest: Lungs clear to auscultation bilaterally. Anterior . Cardiovascular: Irregular  rate and rhythm,  no murmur,   Abdomen: Soft, non-tender, bowel sounds are active. No organomegaly. Extremities: 2+_  edema bilaterally. Skin: Warm and dry. Neuro: CN II-XII grossly intact, Strength and sensation grossly intact. Data:      Prior to Admission medications    Medication Sig Start Date End Date Taking? Authorizing Provider   metFORMIN (GLUCOPHAGE) 850 mg tablet Take 850 mg by mouth two (2) times daily (with meals). Dick Conroy MD   levothyroxine (SYNTHROID) 125 mcg tablet Take 125 mcg by mouth Daily (before breakfast).       Dick Conroy MD   losartan (COZAAR) 100 mg tablet Take 100 mg by mouth daily. Dick Conroy MD   guanFACINE 2 mg Tab Take 2 mg by mouth nightly. Dick Conroy MD   glimepiride (AMARYL) 4 mg tablet Take 4 mg by mouth nightly. Dick Conroy MD   simvastatin (ZOCOR) 80 mg tablet Take 80 mg by mouth every other day. Dick Conroy MD   HYDROcodone-acetaminophen (LORTAB) 2.5-500 mg per tablet Take 1 Tab by mouth every six (6) hours as needed for Pain. 2/16/13   Jamar Parr MD   aspirin 81 mg CpDR Take 81 mg by mouth daily. 2/16/13   Jamar Parr MD   omeprazole (PRILOSEC) 20 mg capsule Take 1 Cap by mouth daily. 3/14/12   Zeinab Franklin MD       Recent Results (from the past 24 hour(s))   EKG, 12 LEAD, INITIAL    Collection Time: 02/22/19 12:57 AM   Result Value Ref Range    Ventricular Rate 113 BPM    Atrial Rate 88 BPM    QRS Duration 88 ms    Q-T Interval 346 ms    QTC Calculation (Bezet) 474 ms    Calculated R Axis 20 degrees    Calculated T Axis 6 degrees    Diagnosis       Atrial fibrillation with rapid ventricular response  Low voltage QRS  Cannot rule out Anterior infarct , age undetermined  When compared with ECG of 16-FEB-2013 11:22,  Atrial fibrillation has replaced Sinus rhythm  Minimal criteria for Anterior infarct are now present  Confirmed by Sabina Jacobsen, P.V. ([id number]) on 2/22/2019 8:14:10 AM     CBC WITH AUTOMATED DIFF    Collection Time: 02/22/19  1:16 AM   Result Value Ref Range    WBC 6.9 3.6 - 11.0 K/uL    RBC 3.87 3.80 - 5.20 M/uL    HGB 11.7 11.5 - 16.0 g/dL    HCT 35.4 35.0 - 47.0 %    MCV 91.5 80.0 - 99.0 FL    MCH 30.2 26.0 - 34.0 PG    MCHC 33.1 30.0 - 36.5 g/dL    RDW 14.0 11.5 - 14.5 %    PLATELET 785 428 - 971 K/uL    MPV 9.5 8.9 - 12.9 FL    NRBC 0.0 0  WBC    ABSOLUTE NRBC 0.00 0.00 - 0.01 K/uL    NEUTROPHILS 70 32 - 75 %    LYMPHOCYTES 21 12 - 49 %    MONOCYTES 8 5 - 13 %    EOSINOPHILS 0 0 - 7 %    BASOPHILS 1 0 - 1 %    IMMATURE GRANULOCYTES 0 0.0 - 0.5 %    ABS. NEUTROPHILS 4.8 1.8 - 8.0 K/UL    ABS. LYMPHOCYTES 1.4 0.8 - 3.5 K/UL    ABS. MONOCYTES 0.6 0.0 - 1.0 K/UL    ABS. EOSINOPHILS 0.0 0.0 - 0.4 K/UL    ABS. BASOPHILS 0.0 0.0 - 0.1 K/UL    ABS. IMM. GRANS. 0.0 0.00 - 0.04 K/UL    DF AUTOMATED     METABOLIC PANEL, COMPREHENSIVE    Collection Time: 02/22/19  1:16 AM   Result Value Ref Range    Sodium 134 (L) 136 - 145 mmol/L    Potassium 3.4 (L) 3.5 - 5.1 mmol/L    Chloride 99 97 - 108 mmol/L    CO2 26 21 - 32 mmol/L    Anion gap 9 5 - 15 mmol/L    Glucose 232 (H) 65 - 100 mg/dL    BUN 11 6 - 20 MG/DL    Creatinine 0.84 0.55 - 1.02 MG/DL    BUN/Creatinine ratio 13 12 - 20      GFR est AA >60 >60 ml/min/1.73m2    GFR est non-AA >60 >60 ml/min/1.73m2    Calcium 8.3 (L) 8.5 - 10.1 MG/DL    Bilirubin, total 1.7 (H) 0.2 - 1.0 MG/DL    ALT (SGPT) 22 12 - 78 U/L    AST (SGOT) 34 15 - 37 U/L    Alk. phosphatase 76 45 - 117 U/L    Protein, total 8.2 6.4 - 8.2 g/dL    Albumin 3.6 3.5 - 5.0 g/dL    Globulin 4.6 (H) 2.0 - 4.0 g/dL    A-G Ratio 0.8 (L) 1.1 - 2.2     CK W/ REFLX CKMB    Collection Time: 02/22/19  1:16 AM   Result Value Ref Range     (H) 26 - 192 U/L   TROPONIN I    Collection Time: 02/22/19  1:16 AM   Result Value Ref Range    Troponin-I, Qt. <0.05 <0.05 ng/mL   TSH 3RD GENERATION    Collection Time: 02/22/19  1:16 AM   Result Value Ref Range    TSH 6.08 (H) 0.36 - 3.74 uIU/mL   SAMPLES BEING HELD    Collection Time: 02/22/19  1:16 AM   Result Value Ref Range    SAMPLES BEING HELD BL     COMMENT        Add-on orders for these samples will be processed based on acceptable specimen integrity and analyte stability, which may vary by analyte. NT-PRO BNP    Collection Time: 02/22/19  1:16 AM   Result Value Ref Range    NT pro-BNP 1,401 (H) <450 PG/ML   CK-MB,QUANT.     Collection Time: 02/22/19  1:16 AM   Result Value Ref Range    CK - MB 2.0 <3.6 NG/ML    CK-MB Index 0.9 0.0 - 2.5     CULTURE, BLOOD, PAIRED    Collection Time: 02/22/19  4:42 AM   Result Value Ref Range    Special Requests: NO SPECIAL REQUESTS      Culture result: NO GROWTH AFTER 1 HOUR     LACTIC ACID    Collection Time: 02/22/19  4:42 AM   Result Value Ref Range    Lactic acid 1.5 0.4 - 2.0 MMOL/L   TROPONIN I    Collection Time: 02/22/19  4:42 AM   Result Value Ref Range    Troponin-I, Qt. <0.05 <0.05 ng/mL   POC LACTIC ACID    Collection Time: 02/22/19  4:47 AM   Result Value Ref Range    Lactic Acid (POC) 1.36 0.40 - 2.00 mmol/L   GLUCOSE, POC    Collection Time: 02/22/19  5:40 AM   Result Value Ref Range    Glucose (POC) 190 (H) 65 - 100 mg/dL    Performed by Sonya Blackwell, POC    Collection Time: 02/22/19  8:02 AM   Result Value Ref Range    Glucose (POC) 220 (H) 65 - 100 mg/dL    Performed by Prosper Samuel

## 2019-02-22 NOTE — ED NOTES
Assumed care of pt. Pt report shortness of breath on exertion for the past week, it has been worse over the past couple of days. Dr. Williams Ferguson at bedside. Pt reports when she bends over that it is more difficult for her to breathe. Pt reports no hx of heart failure. Pt reports leg swelling. Ankles are visibly swollen. Pt takes \"fluid pills\" but did not take it today. Pt reports cough, productive, brown mucous. Pt reports no fever. Pt reports no hx of asthma or COPD.

## 2019-02-22 NOTE — PROGRESS NOTES
Pharmacy Clarification of the Prior to Admission Medication Regimen Retrospective to the Admission Medication Reconciliation The patient was interviewed regarding clarification of the prior to admission medication regimen. Daughter and great-grand daughter were present in room and obtained permission from patient to discuss drug regimen with visitor(s) present. Patient was questioned regarding use of any other inhalers, topical products, over the counter medications, herbal medications, vitamin products or ophthalmic/nasal/otic medication use. Information Obtained From: Patient, Malia Thompson Recommendations/Findings: The following amendments were made to the patient's active medication list on file at [facility]:  
 
1) Additions:  
? furosemide (LASIX) 40 mg tablet 
? cloNIDine HCl (CATAPRES) 0.1 mg tablet 
? amLODIPine (NORVASC) 5 mg tablet 
? metoprolol tartrate (LOPRESSOR) 50 mg tablet ? pravastatin (PRAVACHOL) 80 mg tablet 
? cholecalciferol (VITAMIN D3) 2,000 unit cap capsule 
? calcium-cholecalciferol, D3, (CALTRATE 600+D) tablet ? peg 400-propylene glycol (SYSTANE, PROPYLENE GLYCOL,) 0.4-0.3 % drop 2) Removals: ? Simvastatin ? Guanfacine ? Norco 
? omeprazole 3) Changes: 
? metFORMIN (GLUCOPHAGE) (Old regimen: (strength 850 mg) 850 mg BID /New regimen: (strength 500 mg) 500 mg BID) ? glimepiride (AMARYL) 4 mg tablet (Old regimen: 4 mg QHS /New regimen: 4-8 mg QHS PRN) ? aspirin (ASPIRIN) tablet (Old regimen: (strength 81 mg) 81 mg daily /New regimen: (strength 325) 325 mg daily) 4) Pertinent Pharmacy Findings: 
? furosemide (LASIX) 40 mg tablet: This agent is prescribed daily however, the patient stated she was non-compliant due to frequent urination. ? glimepiride (AMARYL) 4 mg tablet: This agent is prescribed daily however, the patient self medicates based on her BG readings. PTA medication list was corrected to the following:  
 
Prior to Admission Medications Prescriptions Last Dose Informant Patient Reported? Taking? amLODIPine (NORVASC) 5 mg tablet 2/21/2019 at Unknown time Self Yes Yes Sig: Take 5 mg by mouth daily. aspirin (ASPIRIN) 325 mg tablet 2/21/2019 at Unknown time Self Yes Yes Sig: Take 325 mg by mouth daily. calcium-cholecalciferol, D3, (CALTRATE 600+D) tablet 2/21/2019 at Unknown time Self Yes Yes Sig: Take 1 Tab by mouth two (2) times a day. cholecalciferol (VITAMIN D3) 2,000 unit cap capsule 2/21/2019 at Unknown time Self Yes Yes Sig: Take 2,000 Units by mouth daily. cloNIDine HCl (CATAPRES) 0.1 mg tablet 2/21/2019 at Unknown time Self Yes Yes Sig: Take 0.15 mg by mouth two (2) times a day. (patient takes 1.5 tabs BID) furosemide (LASIX) 40 mg tablet 2/15/2019 at Unknown time Self Yes Yes Sig: Take 40 mg by mouth daily. glimepiride (AMARYL) 4 mg tablet 2/15/2019 at Unknown time Self Yes Yes Sig: Take 4-8 mg by mouth nightly as needed. BS >200  
levothyroxine (SYNTHROID) 125 mcg tablet 2/21/2019 at Unknown time Self Yes Yes Sig: Take 125 mcg by mouth Daily (before breakfast). losartan (COZAAR) 100 mg tablet 2/21/2019 at Unknown time Self Yes Yes Sig: Take 100 mg by mouth daily. metFORMIN (GLUCOPHAGE) 500 mg tablet 2/21/2019 at Unknown time Self Yes Yes Sig: Take 500 mg by mouth two (2) times daily (with meals). metoprolol tartrate (LOPRESSOR) 50 mg tablet 2/21/2019 at Unknown time Self Yes Yes Sig: Take 75 mg by mouth two (2) times a day. peg 400-propylene glycol (SYSTANE, PROPYLENE GLYCOL,) 0.4-0.3 % drop 2/20/2019 at Unknown time Self Yes Yes Sig: Administer 1 Drop to both eyes two (2) times daily as needed (dry eyes). pravastatin (PRAVACHOL) 80 mg tablet 2/20/2019 at Unknown time Self Yes Yes Sig: Take 80 mg by mouth nightly. Facility-Administered Medications: None Thank you, 
Valerie Moore Rufino Medication History Pharmacy Technician

## 2019-02-22 NOTE — DIABETES MGMT
DTC Progress Note Recommendations/ Comments: Please consider resuming amaryl 4mg ac b (per med rec pt takes at night, however, will likely help BG control better taking it in the am) or beginning lantus 12 units daily if plan to continue to hold OHA's and checking a current a1c. Current hospital DM medication: humalog correction Chart reviewed on Dinora Fernández. Patient is a 77 y.o. female with known Type 2 Diabetes on metformin 850mg ac b/d and amaryl 4mg nightly at home. A1c:  
No results found for: HBA1C, HGBE8, [id number] Recent Glucose Results:  
Lab Results Component Value Date/Time  (H) 02/22/2019 01:16 AM  
 GLUCPOC 220 (H) 02/22/2019 08:02 AM  
 GLUCPOC 190 (H) 02/22/2019 05:40 AM  
  
 
Lab Results Component Value Date/Time Creatinine 0.84 02/22/2019 01:16 AM  
 
Estimated Creatinine Clearance: 65.6 mL/min (based on SCr of 0.84 mg/dL). Active Orders Diet DIET DIABETIC CONSISTENT CARB Regular PO intake: No data found. Will continue to follow as needed. Thank you Casi Nicole, ANAN, RN, CDE [facility] Time spent: 5 min

## 2019-02-23 LAB
GLUCOSE BLD STRIP.AUTO-MCNC: 192 MG/DL (ref 65–100)
GLUCOSE BLD STRIP.AUTO-MCNC: 199 MG/DL (ref 65–100)
GLUCOSE BLD STRIP.AUTO-MCNC: 256 MG/DL (ref 65–100)
GLUCOSE BLD STRIP.AUTO-MCNC: 256 MG/DL (ref 65–100)
SERVICE CMNT-IMP: ABNORMAL
T4 FREE SERPL-MCNC: 1.4 NG/DL (ref 0.8–1.5)
TROPONIN I SERPL-MCNC: <0.05 NG/ML

## 2019-02-23 PROCEDURE — 74011636637 HC RX REV CODE- 636/637: Performed by: INTERNAL MEDICINE

## 2019-02-23 PROCEDURE — 74011000250 HC RX REV CODE- 250: Performed by: INTERNAL MEDICINE

## 2019-02-23 PROCEDURE — 82962 GLUCOSE BLOOD TEST: CPT

## 2019-02-23 PROCEDURE — 94640 AIRWAY INHALATION TREATMENT: CPT

## 2019-02-23 PROCEDURE — 74011000258 HC RX REV CODE- 258: Performed by: INTERNAL MEDICINE

## 2019-02-23 PROCEDURE — 74011250636 HC RX REV CODE- 250/636: Performed by: INTERNAL MEDICINE

## 2019-02-23 PROCEDURE — 84439 ASSAY OF FREE THYROXINE: CPT

## 2019-02-23 PROCEDURE — 65660000000 HC RM CCU STEPDOWN

## 2019-02-23 PROCEDURE — 77010033678 HC OXYGEN DAILY

## 2019-02-23 PROCEDURE — 74011250637 HC RX REV CODE- 250/637: Performed by: INTERNAL MEDICINE

## 2019-02-23 PROCEDURE — 84484 ASSAY OF TROPONIN QUANT: CPT

## 2019-02-23 PROCEDURE — 74011250636 HC RX REV CODE- 250/636: Performed by: EMERGENCY MEDICINE

## 2019-02-23 PROCEDURE — 36415 COLL VENOUS BLD VENIPUNCTURE: CPT

## 2019-02-23 RX ORDER — FUROSEMIDE 10 MG/ML
40 INJECTION INTRAMUSCULAR; INTRAVENOUS 2 TIMES DAILY
Status: DISCONTINUED | OUTPATIENT
Start: 2019-02-23 | End: 2019-02-24

## 2019-02-23 RX ORDER — GUAIFENESIN 600 MG/1
600 TABLET, EXTENDED RELEASE ORAL 2 TIMES DAILY
Status: DISCONTINUED | OUTPATIENT
Start: 2019-02-23 | End: 2019-03-02 | Stop reason: HOSPADM

## 2019-02-23 RX ORDER — IPRATROPIUM BROMIDE AND ALBUTEROL SULFATE 2.5; .5 MG/3ML; MG/3ML
3 SOLUTION RESPIRATORY (INHALATION)
Status: DISCONTINUED | OUTPATIENT
Start: 2019-02-23 | End: 2019-02-23

## 2019-02-23 RX ORDER — IPRATROPIUM BROMIDE AND ALBUTEROL SULFATE 2.5; .5 MG/3ML; MG/3ML
3 SOLUTION RESPIRATORY (INHALATION)
Status: DISCONTINUED | OUTPATIENT
Start: 2019-02-23 | End: 2019-02-24

## 2019-02-23 RX ADMIN — AZITHROMYCIN MONOHYDRATE 500 MG: 500 INJECTION, POWDER, LYOPHILIZED, FOR SOLUTION INTRAVENOUS at 03:58

## 2019-02-23 RX ADMIN — LEVOTHYROXINE SODIUM 125 MCG: 125 TABLET ORAL at 08:57

## 2019-02-23 RX ADMIN — GUAIFENESIN 600 MG: 600 TABLET, EXTENDED RELEASE ORAL at 17:33

## 2019-02-23 RX ADMIN — INSULIN LISPRO 5 UNITS: 100 INJECTION, SOLUTION INTRAVENOUS; SUBCUTANEOUS at 17:32

## 2019-02-23 RX ADMIN — GUANFACINE HYDROCHLORIDE 2 MG: 1 TABLET ORAL at 21:38

## 2019-02-23 RX ADMIN — Medication 10 ML: at 21:38

## 2019-02-23 RX ADMIN — DILTIAZEM HYDROCHLORIDE 10 MG/HR: 5 INJECTION INTRAVENOUS at 05:02

## 2019-02-23 RX ADMIN — INSULIN LISPRO 2 UNITS: 100 INJECTION, SOLUTION INTRAVENOUS; SUBCUTANEOUS at 08:57

## 2019-02-23 RX ADMIN — PANTOPRAZOLE SODIUM 40 MG: 40 TABLET, DELAYED RELEASE ORAL at 08:57

## 2019-02-23 RX ADMIN — CEFTRIAXONE SODIUM 1 G: 1 INJECTION, POWDER, FOR SOLUTION INTRAMUSCULAR; INTRAVENOUS at 05:02

## 2019-02-23 RX ADMIN — IPRATROPIUM BROMIDE AND ALBUTEROL SULFATE 3 ML: .5; 3 SOLUTION RESPIRATORY (INHALATION) at 23:00

## 2019-02-23 RX ADMIN — DILTIAZEM HYDROCHLORIDE 15 MG/HR: 5 INJECTION INTRAVENOUS at 21:15

## 2019-02-23 RX ADMIN — Medication 10 ML: at 17:33

## 2019-02-23 RX ADMIN — ATORVASTATIN CALCIUM 40 MG: 10 TABLET, FILM COATED ORAL at 21:37

## 2019-02-23 RX ADMIN — LOSARTAN POTASSIUM 100 MG: 100 TABLET, FILM COATED ORAL at 08:57

## 2019-02-23 RX ADMIN — FUROSEMIDE 40 MG: 10 INJECTION, SOLUTION INTRAMUSCULAR; INTRAVENOUS at 17:33

## 2019-02-23 RX ADMIN — ASPIRIN 81 MG: 81 TABLET, COATED ORAL at 08:57

## 2019-02-23 RX ADMIN — INSULIN LISPRO 2 UNITS: 100 INJECTION, SOLUTION INTRAVENOUS; SUBCUTANEOUS at 12:52

## 2019-02-23 RX ADMIN — FUROSEMIDE 40 MG: 10 INJECTION, SOLUTION INTRAMUSCULAR; INTRAVENOUS at 08:57

## 2019-02-23 RX ADMIN — INSULIN LISPRO 3 UNITS: 100 INJECTION, SOLUTION INTRAVENOUS; SUBCUTANEOUS at 21:37

## 2019-02-23 RX ADMIN — IPRATROPIUM BROMIDE AND ALBUTEROL SULFATE 3 ML: .5; 3 SOLUTION RESPIRATORY (INHALATION) at 19:08

## 2019-02-23 NOTE — PROGRESS NOTES
Problem: Afib Pathway: Day 2 Goal: Activity/Safety Outcome: Progressing Towards Goal 
Patient up with assist of one and walker. Ambulating to bathroom. Exp wheezing with exertion. Goal: Consults, if ordered Outcome: Progressing Towards Goal 
Cardiology consulted Goal: Nutrition/Diet Outcome: Progressing Towards Goal 
Tolerating diet Goal: Medications Cardizem drip infusing at 10mg/hr. Goal: Respiratory Outcome: Progressing Towards Goal 
2L NC O2 Exp wheezing with exertion Problem: Pneumonia: Day 2 Goal: Activity/Safety Outcome: Progressing Towards Goal 
Up with assistance to bathroom with one and walker. Goal: Consults, if ordered Cardiology consulted Goal: Nutrition/Diet Outcome: Progressing Towards Goal 
Tolerating diet

## 2019-02-23 NOTE — PROGRESS NOTES
Problem: Pressure Injury - Risk of 
Goal: *Prevention of pressure injury Document Fabricio Scale and appropriate interventions in the flowsheet. Outcome: Progressing Towards Goal 
Pressure Injury Interventions: 
Sensory Interventions: Assess changes in LOC, Assess need for specialty bed, Avoid rigorous massage over bony prominences, Check visual cues for pain Moisture Interventions: Absorbent underpads, Apply protective barrier, creams and emollients Activity Interventions: Increase time out of bed, Pressure redistribution bed/mattress(bed type) Mobility Interventions: Assess need for specialty bed, Pressure redistribution bed/mattress (bed type) Nutrition Interventions: Offer support with meals,snacks and hydration Friction and Shear Interventions: Lift sheet, Apply protective barrier, creams and emollients Problem: Falls - Risk of 
Goal: *Absence of Falls Document Hamp Jose Fall Risk and appropriate interventions in the flowsheet. Outcome: Progressing Towards Goal 
Fall Risk Interventions: 
Mobility Interventions: Communicate number of staff needed for ambulation/transfer, Patient to call before getting OOB Medication Interventions: Patient to call before getting OOB, Teach patient to arise slowly

## 2019-02-23 NOTE — PROGRESS NOTES
Progress Note 2/23/2019 10:15 AM 
NAME: Elke Willingham MRN:  [id number] Admit Diagnosis: CHF (congestive heart failure) ([facility] [I50.9] Assessment:  
 
 
Admitted with pneumonia -  Primary diagnosis. Mild pulmonary edema associated. Chronic Atrial Fibrillation . peripheral edema. Diabetes HTN Hyperlipidemia. Elevated TSH   ? Hypothyroid. Echo:  
· Estimated left ventricular ejection fraction is 51 - 55%. Left ventricular mild concentric hypertrophy. · Left atrial cavity size is moderately dilated. · Right atrial cavity size is severely dilated. · Mild aortic valve leaflet calcification present. Mild aortic valve stenosis is present. Mild aortic valve regurgitation is present. · Mitral valve thickening. Moderate to severe mitral valve regurgitation. · Moderate tricuspid valve regurgitation is present. 2/23   OOB in chair. Says she is feeling better today. Lungs still have significant diffuse wheezing. Mild edema. As noted , echo shows normal LV function. Plan:  
 
Continue current regimen. [x]        High complexity decision making was performed Subjective:  
 
Dinora Levine denies chest pain, dyspnea. Discussed with RN events overnight. Patient Active Problem List  
Diagnosis Code  CHF (congestive heart failure) ([facility]) I50.9  Pneumonia J18.9 Review of Systems: 
 
Symptom Y/N Comments  Symptom Y/N Comments Fever/Chills N   Chest Pain N Poor Appetite N   Edema N   
Cough N   Abdominal Pain N Sputum N   Joint Pain N   
SOB/STARR N   Pruritis/Rash N   
Nausea/vomit N   Tolerating PT/OT Y Diarrhea N   Tolerating Diet Y Constipation N   Other Could NOT obtain due to:   
 
Objective:  
  
Physical Exam: 
 
Last 24hrs VS reviewed since prior progress note. Most recent are: 
 
Visit Vitals /68 Pulse 83 Temp 97.8 °F (36.6 °C) Resp 20 Ht 5' 8\" (1.727 m) Wt 100.9 kg (222 lb 7.1 oz) SpO2 98% BMI 33.82 kg/m² Intake/Output Summary (Last 24 hours) at 2/23/2019 1015 Last data filed at 2/23/2019 4490 Gross per 24 hour Intake 757.16 ml Output 200 ml Net 557.16 ml General Appearance: Well developed, well nourished, alert & oriented x 3,  
 no acute distress. Ears/Nose/Mouth/Throat: Hearing grossly normal. 
Neck: Supple. Chest: Lungs clear to auscultation bilaterally. Cardiovascular: Regular rate and rhythm, S1S2 normal, no murmur. Abdomen: Soft, non-tender, bowel sounds are active. Extremities: No edema bilaterally. Skin: Warm and dry. PMH/SH reviewed - no change compared to H&P Data Review Telemetry: normal sinus rhythm Lab Data Personally Reviewed: 
 
Recent Labs  
  02/22/19 
0116 WBC 6.9 HGB 11.7 HCT 35.4  LABRCNT(INR:3,PTP:3,APTT:3,) Recent Labs  
  02/22/19 0116 * K 3.4*  
CL 99  
CO2 26 BUN 11  
CREA 0.84 * CA 8.3* LABRCNT(CPK:3,CpKMB:3,ckndx:3,troiq:3)No results found for: CHOL, CHOLX, CHLST, CHOLV, HDL, LDL, LDLC, DLDLP, TGLX, TRIGL, TRIGP, CHHD, CHHDXLABRCNT(sgot:3,gpt:3,ap:3,tbiL:3,TP:3,ALB:3,GLOB:3,ggt:3,aml:3,amyp:3,lpse:3,hlpse:3)No results for input(s): PH, PCO2, PO2 in the last 72 hours. No results found for: CHOL, CHOLX, CHLST, CHOLV, HDL, LDL, LDLC, DLDLP, TGLX, TRIGL, TRIGP, CHHD, CHHDXMEDTABLEAntione Cornejo MD 
No results for input(s): PH, PCO2, PO2 in the last 72 hours. Medications Personally Reviewed: 
 
Current Facility-Administered Medications Medication Dose Route Frequency  azithromycin (ZITHROMAX) 500 mg in 0.9% sodium chloride (MBP/ADV) 250 mL  500 mg IntraVENous Q24H  
 glucose chewable tablet 16 g  4 Tab Oral PRN  
 dextrose (D50W) injection syrg 12.5-25 g  25-50 mL IntraVENous PRN  
 glucagon (GLUCAGEN) injection 1 mg  1 mg IntraMUSCular PRN  
 insulin lispro (HUMALOG) injection   SubCUTAneous AC&HS  atorvastatin (LIPITOR) tablet 40 mg  40 mg Oral EVERY OTHER DAY  
  losartan (COZAAR) tablet 100 mg  100 mg Oral DAILY  guanFACINE IR (TENEX) tablet 2 mg  2 mg Oral QHS  
 HYDROcodone-acetaminophen (NORCO) 5-325 mg per tablet 0.5 Tab  0.5 Tab Oral Q6H PRN  
 aspirin delayed-release tablet 81 mg  81 mg Oral DAILY  pantoprazole (PROTONIX) tablet 40 mg  40 mg Oral ACB  levothyroxine (SYNTHROID) tablet 125 mcg  125 mcg Oral ACB  sodium chloride (NS) flush 5-40 mL  5-40 mL IntraVENous Q8H  
 sodium chloride (NS) flush 5-40 mL  5-40 mL IntraVENous PRN  
 acetaminophen (TYLENOL) tablet 650 mg  650 mg Oral Q6H PRN  
 ondansetron (ZOFRAN) injection 4 mg  4 mg IntraVENous Q8H PRN  
 bisacodyl (DULCOLAX) tablet 5 mg  5 mg Oral DAILY PRN  
 cefTRIAXone (ROCEPHIN) 1 g in 0.9% sodium chloride (MBP/ADV) 50 mL  1 g IntraVENous Q24H  
 furosemide (LASIX) injection 40 mg  40 mg IntraVENous DAILY  dilTIAZem (CARDIZEM) 125 mg in dextrose 5% 125 mL infusion  0-15 mg/hr IntraVENous TITRATE Chucky Ruby MD

## 2019-02-23 NOTE — ED NOTES
Pt AAO x4, color appropriate for race, respirations even/regular, denies chest pain and CMS intact in extremities.

## 2019-02-24 ENCOUNTER — APPOINTMENT (OUTPATIENT)
Dept: GENERAL RADIOLOGY | Age: 79
DRG: 193 | End: 2019-02-24
Attending: INTERNAL MEDICINE
Payer: MEDICARE

## 2019-02-24 LAB
ALBUMIN SERPL-MCNC: 3 G/DL (ref 3.5–5)
ALBUMIN/GLOB SERPL: 0.7 {RATIO} (ref 1.1–2.2)
ALP SERPL-CCNC: 79 U/L (ref 45–117)
ALT SERPL-CCNC: 21 U/L (ref 12–78)
ANION GAP SERPL CALC-SCNC: 10 MMOL/L (ref 5–15)
AST SERPL-CCNC: 27 U/L (ref 15–37)
BACTERIA SPEC CULT: NORMAL
BASOPHILS # BLD: 0.1 K/UL (ref 0–0.1)
BASOPHILS NFR BLD: 1 % (ref 0–1)
BILIRUB SERPL-MCNC: 0.9 MG/DL (ref 0.2–1)
BUN SERPL-MCNC: 10 MG/DL (ref 6–20)
BUN/CREAT SERPL: 14 (ref 12–20)
CALCIUM SERPL-MCNC: 7.8 MG/DL (ref 8.5–10.1)
CHLORIDE SERPL-SCNC: 104 MMOL/L (ref 97–108)
CO2 SERPL-SCNC: 25 MMOL/L (ref 21–32)
CREAT SERPL-MCNC: 0.69 MG/DL (ref 0.55–1.02)
DIFFERENTIAL METHOD BLD: ABNORMAL
EOSINOPHIL # BLD: 0.1 K/UL (ref 0–0.4)
EOSINOPHIL NFR BLD: 1 % (ref 0–7)
ERYTHROCYTE [DISTWIDTH] IN BLOOD BY AUTOMATED COUNT: 13.6 % (ref 11.5–14.5)
GLOBULIN SER CALC-MCNC: 4.3 G/DL (ref 2–4)
GLUCOSE BLD STRIP.AUTO-MCNC: 221 MG/DL (ref 65–100)
GLUCOSE BLD STRIP.AUTO-MCNC: 250 MG/DL (ref 65–100)
GLUCOSE BLD STRIP.AUTO-MCNC: 262 MG/DL (ref 65–100)
GLUCOSE BLD STRIP.AUTO-MCNC: 281 MG/DL (ref 65–100)
GLUCOSE SERPL-MCNC: 210 MG/DL (ref 65–100)
GRAM STN SPEC: NORMAL
HCT VFR BLD AUTO: 31.6 % (ref 35–47)
HGB BLD-MCNC: 10.4 G/DL (ref 11.5–16)
IMM GRANULOCYTES # BLD AUTO: 0 K/UL (ref 0–0.04)
IMM GRANULOCYTES NFR BLD AUTO: 0 % (ref 0–0.5)
LYMPHOCYTES # BLD: 1.5 K/UL (ref 0.8–3.5)
LYMPHOCYTES NFR BLD: 26 % (ref 12–49)
MCH RBC QN AUTO: 30.4 PG (ref 26–34)
MCHC RBC AUTO-ENTMCNC: 32.9 G/DL (ref 30–36.5)
MCV RBC AUTO: 92.4 FL (ref 80–99)
MONOCYTES # BLD: 0.4 K/UL (ref 0–1)
MONOCYTES NFR BLD: 8 % (ref 5–13)
NEUTS SEG # BLD: 3.5 K/UL (ref 1.8–8)
NEUTS SEG NFR BLD: 64 % (ref 32–75)
NRBC # BLD: 0 K/UL (ref 0–0.01)
NRBC BLD-RTO: 0 PER 100 WBC
PLATELET # BLD AUTO: 199 K/UL (ref 150–400)
PMV BLD AUTO: 9.5 FL (ref 8.9–12.9)
POTASSIUM SERPL-SCNC: 2.9 MMOL/L (ref 3.5–5.1)
POTASSIUM SERPL-SCNC: 2.9 MMOL/L (ref 3.5–5.1)
PROT SERPL-MCNC: 7.3 G/DL (ref 6.4–8.2)
RBC # BLD AUTO: 3.42 M/UL (ref 3.8–5.2)
RBC MORPH BLD: ABNORMAL
SERVICE CMNT-IMP: ABNORMAL
SERVICE CMNT-IMP: NORMAL
SODIUM SERPL-SCNC: 139 MMOL/L (ref 136–145)
WBC # BLD AUTO: 5.6 K/UL (ref 3.6–11)

## 2019-02-24 PROCEDURE — 82962 GLUCOSE BLOOD TEST: CPT

## 2019-02-24 PROCEDURE — 74011250637 HC RX REV CODE- 250/637: Performed by: INTERNAL MEDICINE

## 2019-02-24 PROCEDURE — 74011000250 HC RX REV CODE- 250: Performed by: INTERNAL MEDICINE

## 2019-02-24 PROCEDURE — 74011000258 HC RX REV CODE- 258: Performed by: INTERNAL MEDICINE

## 2019-02-24 PROCEDURE — 74011636637 HC RX REV CODE- 636/637: Performed by: INTERNAL MEDICINE

## 2019-02-24 PROCEDURE — 77010033678 HC OXYGEN DAILY

## 2019-02-24 PROCEDURE — 36415 COLL VENOUS BLD VENIPUNCTURE: CPT

## 2019-02-24 PROCEDURE — 84132 ASSAY OF SERUM POTASSIUM: CPT

## 2019-02-24 PROCEDURE — 74011250636 HC RX REV CODE- 250/636: Performed by: EMERGENCY MEDICINE

## 2019-02-24 PROCEDURE — 74011250636 HC RX REV CODE- 250/636: Performed by: INTERNAL MEDICINE

## 2019-02-24 PROCEDURE — 94640 AIRWAY INHALATION TREATMENT: CPT

## 2019-02-24 PROCEDURE — 80053 COMPREHEN METABOLIC PANEL: CPT

## 2019-02-24 PROCEDURE — 65660000000 HC RM CCU STEPDOWN

## 2019-02-24 PROCEDURE — 85025 COMPLETE CBC W/AUTO DIFF WBC: CPT

## 2019-02-24 PROCEDURE — 71046 X-RAY EXAM CHEST 2 VIEWS: CPT

## 2019-02-24 RX ORDER — POTASSIUM CHLORIDE 750 MG/1
40 TABLET, FILM COATED, EXTENDED RELEASE ORAL
Status: COMPLETED | OUTPATIENT
Start: 2019-02-24 | End: 2019-02-25

## 2019-02-24 RX ORDER — ENOXAPARIN SODIUM 100 MG/ML
1 INJECTION SUBCUTANEOUS EVERY 12 HOURS
Status: DISCONTINUED | OUTPATIENT
Start: 2019-02-24 | End: 2019-03-02 | Stop reason: HOSPADM

## 2019-02-24 RX ORDER — POTASSIUM CHLORIDE 750 MG/1
40 TABLET, FILM COATED, EXTENDED RELEASE ORAL
Status: COMPLETED | OUTPATIENT
Start: 2019-02-25 | End: 2019-02-25

## 2019-02-24 RX ORDER — IPRATROPIUM BROMIDE AND ALBUTEROL SULFATE 2.5; .5 MG/3ML; MG/3ML
3 SOLUTION RESPIRATORY (INHALATION)
Status: DISCONTINUED | OUTPATIENT
Start: 2019-02-24 | End: 2019-02-26

## 2019-02-24 RX ORDER — FUROSEMIDE 40 MG/1
40 TABLET ORAL DAILY
Status: DISCONTINUED | OUTPATIENT
Start: 2019-02-24 | End: 2019-02-25

## 2019-02-24 RX ADMIN — IPRATROPIUM BROMIDE AND ALBUTEROL SULFATE 3 ML: .5; 3 SOLUTION RESPIRATORY (INHALATION) at 08:38

## 2019-02-24 RX ADMIN — ENOXAPARIN SODIUM 90 MG: 100 INJECTION, SOLUTION INTRAVENOUS; SUBCUTANEOUS at 21:44

## 2019-02-24 RX ADMIN — LEVOTHYROXINE SODIUM 125 MCG: 125 TABLET ORAL at 08:35

## 2019-02-24 RX ADMIN — Medication 10 ML: at 21:48

## 2019-02-24 RX ADMIN — INSULIN LISPRO 5 UNITS: 100 INJECTION, SOLUTION INTRAVENOUS; SUBCUTANEOUS at 12:39

## 2019-02-24 RX ADMIN — GUAIFENESIN 600 MG: 600 TABLET, EXTENDED RELEASE ORAL at 08:35

## 2019-02-24 RX ADMIN — DILTIAZEM HYDROCHLORIDE 15 MG/HR: 5 INJECTION INTRAVENOUS at 04:39

## 2019-02-24 RX ADMIN — FUROSEMIDE 40 MG: 40 TABLET ORAL at 08:35

## 2019-02-24 RX ADMIN — GUAIFENESIN 600 MG: 600 TABLET, EXTENDED RELEASE ORAL at 17:34

## 2019-02-24 RX ADMIN — GUANFACINE HYDROCHLORIDE 2 MG: 1 TABLET ORAL at 21:45

## 2019-02-24 RX ADMIN — ENOXAPARIN SODIUM 90 MG: 100 INJECTION, SOLUTION INTRAVENOUS; SUBCUTANEOUS at 08:35

## 2019-02-24 RX ADMIN — DILTIAZEM HYDROCHLORIDE 15 MG/HR: 5 INJECTION INTRAVENOUS at 12:40

## 2019-02-24 RX ADMIN — Medication 10 ML: at 17:35

## 2019-02-24 RX ADMIN — ASPIRIN 81 MG: 81 TABLET, COATED ORAL at 08:35

## 2019-02-24 RX ADMIN — IPRATROPIUM BROMIDE AND ALBUTEROL SULFATE 3 ML: .5; 3 SOLUTION RESPIRATORY (INHALATION) at 03:00

## 2019-02-24 RX ADMIN — INSULIN LISPRO 3 UNITS: 100 INJECTION, SOLUTION INTRAVENOUS; SUBCUTANEOUS at 21:43

## 2019-02-24 RX ADMIN — IPRATROPIUM BROMIDE AND ALBUTEROL SULFATE 3 ML: .5; 3 SOLUTION RESPIRATORY (INHALATION) at 14:14

## 2019-02-24 RX ADMIN — LOSARTAN POTASSIUM 100 MG: 100 TABLET, FILM COATED ORAL at 08:35

## 2019-02-24 RX ADMIN — AZITHROMYCIN MONOHYDRATE 500 MG: 500 INJECTION, POWDER, LYOPHILIZED, FOR SOLUTION INTRAVENOUS at 04:26

## 2019-02-24 RX ADMIN — INSULIN LISPRO 3 UNITS: 100 INJECTION, SOLUTION INTRAVENOUS; SUBCUTANEOUS at 08:35

## 2019-02-24 RX ADMIN — CEFTRIAXONE SODIUM 1 G: 1 INJECTION, POWDER, FOR SOLUTION INTRAMUSCULAR; INTRAVENOUS at 04:26

## 2019-02-24 RX ADMIN — INSULIN LISPRO 5 UNITS: 100 INJECTION, SOLUTION INTRAVENOUS; SUBCUTANEOUS at 17:34

## 2019-02-24 RX ADMIN — PANTOPRAZOLE SODIUM 40 MG: 40 TABLET, DELAYED RELEASE ORAL at 08:35

## 2019-02-24 RX ADMIN — IPRATROPIUM BROMIDE AND ALBUTEROL SULFATE 3 ML: .5; 3 SOLUTION RESPIRATORY (INHALATION) at 19:11

## 2019-02-24 NOTE — PROGRESS NOTES
ADULT PROTOCOL: JET AEROSOL ASSESSMENT Patient  Shayne Tesfaye     77 y.o.   female     2/24/2019  9:03 AM 
 
Breath Sounds Pre Procedure: Right Breath Sounds: Diminished Left Breath Sounds: Diminished Breath Sounds Post Procedure: Right Breath Sounds: Diminished Left Breath Sounds: Diminished Breathing pattern: Pre procedure Breathing Pattern: Regular Post procedure Breathing Pattern: Regular Heart Rate: Pre procedure Pulse: 82 
         Post procedure Pulse: 83 Resp Rate: Pre procedure Respirations: 18 Post procedure Respirations: 20 
 
 
Oxygen: O2 Device: Nasal cannula  2lpm 
   Changed: Yes to 1lpm 
 
SpO2: Pre procedure SpO2: 100 %   2lpm oxygen Post procedure SpO2: 100 %  2lpm oxygen Nebulizer Therapy: Current medications Aerosolized Medications: DuoNeb Changed: Yes TID Duoneb Problem List:  
Patient Active Problem List  
Diagnosis Code  CHF (congestive heart failure) ([facility]) I50.9  Pneumonia J18.9 Respiratory Therapist: RT Benita

## 2019-02-24 NOTE — PROGRESS NOTES
Problem: Pressure Injury - Risk of 
Goal: *Prevention of pressure injury Document Fabricio Scale and appropriate interventions in the flowsheet. Outcome: Progressing Towards Goal 
Pressure Injury Interventions: 
Sensory Interventions: Assess changes in LOC, Avoid rigorous massage over bony prominences, Keep linens dry and wrinkle-free, Maintain/enhance activity level Moisture Interventions: Absorbent underpads, Apply protective barrier, creams and emollients Activity Interventions: Increase time out of bed, PT/OT evaluation Mobility Interventions: Float heels, Pressure redistribution bed/mattress (bed type) Nutrition Interventions: Offer support with meals,snacks and hydration Friction and Shear Interventions: Lift sheet, Apply protective barrier, creams and emollients Problem: Falls - Risk of 
Goal: *Absence of Falls Document Renea End Fall Risk and appropriate interventions in the flowsheet. Outcome: Progressing Towards Goal 
Fall Risk Interventions: 
Mobility Interventions: Communicate number of staff needed for ambulation/transfer, Patient to call before getting OOB, Utilize walker, cane, or other assistive device Medication Interventions: Patient to call before getting OOB, Teach patient to arise slowly

## 2019-02-24 NOTE — PROGRESS NOTES
Hospitalist Progress Note NAME: Amber Retana :  1940 MRN:  [id number] Assessment / Plan: 
CAP Cont IV abx with azithro/ceftriaxone Follow-up sputum culture DC IV lasix, resume home PO lasix Echocardiogram 2019 
nL LV function, mild LVH, severe RA dilitation Cardiology consultation appreciated 
troponins negative, no chest pain DM Cont SSI w POCs Hold home metformin and glimeperide Hypertension Continue home  Cozaar 100 mg daily Hypothyroidism Continue Synthyroid 125 mcg daily Elevated TSH noted FT4 nL, repeat TSH outpatient once acute medical issues addressed Hyperlipidemia Continue simvastatin 80 mg daily 
  
AF with RVR  
give one dose Lovenox  
cardio consult  
start on Cardizem drip, defer to cardio on rate control On metoprolol at home Chadsvasc 5, resume Tx dose lovenox, consider NOAC if covered 
  
  
Code Status: Full Surrogate Decision Maker: 
Abdullahi Fernández  
  
  
DVT Prophylaxis: Lovenox GI Prophylaxis: not indicated 
  
Baseline: independent Subjective: Chief Complaint / Reason for Physician Visit Breathing improved. Denies CP/palpitations. Discussed with RN events overnight. Review of Systems: 
Symptom Y/N Comments  Symptom Y/N Comments Fever/Chills n   Chest Pain n   
Poor Appetite    Edema n   
Cough n   Abdominal Pain Sputum n   Joint Pain SOB/STARR y   Pruritis/Rash Nausea/vomit n   Tolerating PT/OT Diarrhea n   Tolerating Diet y Constipation n   Other Could NOT obtain due to:   
 
Objective: VITALS:  
Last 24hrs VS reviewed since prior progress note. Most recent are: 
Patient Vitals for the past 24 hrs: 
 Temp Pulse Resp BP SpO2  
19 1922 97.7 °F (36.5 °C) (!) 101 18 148/69 99 % 19 1909     98 % 19 1550 97.9 °F (36.6 °C) 90 20 139/56 97 % 19 1121 98.6 °F (37 °C) 84 20 132/63 98 % 19 0719 97.8 °F (36.6 °C) 83 20 152/68 98 % 02/23/19 0406 97.7 °F (36.5 °C) 87 16 (!) 126/102 97 % 02/22/19 2355 97.9 °F (36.6 °C) 85 17 176/81 97 % 02/22/19 2238  86  161/87  Intake/Output Summary (Last 24 hours) at 2/23/2019 2154 Last data filed at 2/23/2019 1550 Gross per 24 hour Intake 1043.33 ml Output 850 ml Net 193.33 ml PHYSICAL EXAM: 
General: WD, WN. Alert, cooperative, no acute distress   
EENT:  EOMI. Anicteric sclerae. MMM Resp:  CTA bilaterally, no wheezing or rales. No accessory muscle use CV:  Regular  rhythm,  No edema GI:  Soft, Non distended, Non tender.  +Bowel sounds Neurologic:  Alert and oriented X 3, normal speech, Psych:   Good insight. Not anxious nor agitated Skin:  No rashes. No jaundice Reviewed most current lab test results and cultures  YES Reviewed most current radiology test results   YES Review and summation of old records today    NO Reviewed patient's current orders and MAR    YES 
PMH/SH reviewed - no change compared to H&P 
________________________________________________________________________ Care Plan discussed with: 
  Comments Patient x Family  x   
RN x Care Manager Consultant Multidiciplinary team rounds were held today with , nursing, pharmacist and clinical coordinator. Patient's plan of care was discussed; medications were reviewed and discharge planning was addressed. ________________________________________________________________________ Total NON critical care TIME:  25   Minutes Total CRITICAL CARE TIME Spent:   Minutes non procedure based Comments >50% of visit spent in counseling and coordination of care x   
________________________________________________________________________ Melissa Sommers DO  
 
Procedures: see electronic medical records for all procedures/Xrays and details which were not copied into this note but were reviewed prior to creation of Plan.    
 
LABS: 
 I reviewed today's most current labs and imaging studies. Pertinent labs include: 
Recent Labs  
  02/22/19 0116 WBC 6.9 HGB 11.7 HCT 35.4  Recent Labs  
  02/22/19 0116 * K 3.4*  
CL 99  
CO2 26 * BUN 11  
CREA 0.84 CA 8.3* ALB 3.6 TBILI 1.7* SGOT 34 ALT 22 Signed: Clemente Gómez, DO

## 2019-02-24 NOTE — PROGRESS NOTES
PCU SHIFT NURSING NOTE Bedside and Verbal shift change report given to Surya Walter RN (oncoming nurse) by Lauren Isaacs RN (offgoing nurse). Report included the following information SBAR, Kardex, Intake/Output, MAR, Med Rec Status and Cardiac Rhythm Afib. Prashant Vela Shift Summary:  
 
 
Admission Date 2/22/2019 Admission Diagnosis CHF (congestive heart failure) ([facility] [address] [I50.9] Consults IP CONSULT TO CARDIOLOGY Consults [x]PT [x]OT []Speech [x]Case Management  
  
[] Palliative Cardiac Monitoring Order  
[x]Yes []No  
 
IV drips [x]Yes Drip:  Cardizem          Dose: 15mg/hr Drip:                            Dose: 
Drip:                            Dose:  
[]No  
 
GI Prophylaxis [x]Yes []No  
 
 
 
DVT Prophylaxis SCDs:  Sequential Compression Device: Left lower extremity Juan stockings:     
  
[x] Medication []Contraindicated []None Activity Level Activity Level: Up with Assistance Activity Assistance: Partial (one person) Purposeful Rounding every 1-2 hour? [x]Yes Turner Score  Total Score: 3 Bed Alarm (If score 3 or >) [x]Yes  
[] Refused (See signed refusal form in chart) Fabricio Score  Fabricio Score: 17 Fabricio Score (if score 14 or less) []PMT consult  
[]Wound Care consult []Specialty bed  
[] Nutrition consult Needs prior to discharge:  
Home O2 required:   
[]Yes []No  
 If yes, how much O2 required? Currently on 2L NC O2 Other:  
 Last Bowel Movement: Last Bowel Movement Date: 02/24/19 Influenza Vaccine Received Flu Vaccine for Current Season (usually Sept-March): Yes Pneumonia Vaccine Diet Active Orders Diet DIET DIABETIC CONSISTENT CARB Regular LDAs Peripheral IV 02/22/19 Right;Posterior Wrist (Active) Site Assessment Clean, dry, & intact 2/24/2019  7:49 AM  
Phlebitis Assessment 0 2/24/2019  7:49 AM  
Infiltration Assessment 0 2/24/2019  7:49 AM  
 Dressing Status Clean, dry, & intact 2/24/2019  7:49 AM  
Dressing Type Tape;Transparent 2/24/2019  7:49 AM  
Hub Color/Line Status Pink; Infusing 2/24/2019  7:49 AM  
                  
Urinary Catheter Intake & Output Date 02/23/19 0700 - 02/24/19 8099 02/24/19 0700 - 02/25/19 8767 Shift 0452-9416 1917-7660 24 Hour Total 0700-1859 1900-0659 24 Hour Total  
INTAKE  
P.O. 720  720 480  480  
  P. O. 720  720 480  480  
I. V.(mL/kg/hr)  307.8(0.3) 307.8(0.1) Cardizem Volume  257.8 257.8 Volume (cefTRIAXone (ROCEPHIN) 1 g in 0.9% sodium chloride (MBP/ADV) 50 mL)  50 50 Shift Total(mL/kg) 720(7.1) 307.8(3.4) 1027. 8(11.3) 480(5.3)  480(5.3) OUTPUT Urine(mL/kg/hr) 850(0.7) 2(0) 852(0.4) 900  900 Urine Voided 850 2 852 900  900 Urine Occurrence(s)  4 x 4 x Shift Total(mL/kg) 850(8.4) 2(0) 852(9.4) 900(9.9)  900(9.9) NET -130 305.8 175.8 -420  -420 Weight (kg) 100.9 90.8 90.8 90.8 90.8 90.8 Readmission Risk Assessment Tool Score Low Risk 12 Total Score 3 Has Seen PCP in Last 6 Months (Yes=3, No=0)  
 5 Pt. Coverage (Medicare=5 , Medicaid, or Self-Pay=4) 4 Charlson Comorbidity Score (Age + Comorbid Conditions) Criteria that do not apply:  
 . Living with Significant Other. Assisted Living. LTAC. SNF. or  
Rehab Patient Length of Stay (>5 days = 3) IP Visits Last 12 Months (1-3=4, 4=9, >4=11) Expected Length of Stay 4d 4h Actual Length of Stay 2

## 2019-02-24 NOTE — PROGRESS NOTES
PCU SHIFT NURSING NOTE Bedside and Verbal shift change report given to Jayla Sunshine RN (oncoming nurse) by David Mcgowan RN (offgoing nurse). Report included the following information SBAR, Kardex, Intake/Output, MAR, Accordion, Recent Results, Med Rec Status and Cardiac Rhythm A Fib. Shift Summary:  
 
 
Admission Date 2/22/2019 Admission Diagnosis CHF (congestive heart failure) ([facility] [address] [I50.9] Consults IP CONSULT TO CARDIOLOGY Consults [x]PT [x]OT []Speech [x]Case Management  
  
[] Palliative Cardiac Monitoring Order  
[x]Yes []No  
 
IV drips [x]Yes Drip: Cardizem           Dose: 0-15 mg 
Drip:                            Dose: 
Drip:                            Dose:  
[]No  
 
GI Prophylaxis [x]Yes []No  
 
 
 
DVT Prophylaxis SCDs:  Sequential Compression Device: Bilateral  
     
 Juan stockings:     
  
[] Medication []Contraindicated []None Activity Level Activity Level: Up with Assistance Activity Assistance: Partial (one person) Purposeful Rounding every 1-2 hour? [x]Yes Turner Score  Total Score: 2 Bed Alarm (If score 3 or >) []Yes  
[] Refused (See signed refusal form in chart) Fabricio Score  Fabricio Score: 17 Fabricio Score (if score 14 or less) []PMT consult  
[]Wound Care consult []Specialty bed  
[] Nutrition consult Needs prior to discharge:  
Home O2 required:   
[]Yes  
[x]No  
 If yes, how much O2 required? Other:  
 Last Bowel Movement: Last Bowel Movement Date: 02/22/19 Influenza Vaccine Received Flu Vaccine for Current Season (usually Sept-March): Yes Pneumonia Vaccine Diet Active Orders Diet DIET DIABETIC CONSISTENT CARB Regular LDAs Peripheral IV 02/22/19 Right Antecubital (Active) Site Assessment Clean, dry, & intact 2/23/2019  7:22 PM  
Phlebitis Assessment 0 2/23/2019  7:22 PM  
Infiltration Assessment 0 2/23/2019  7:22 PM  
 Dressing Status Clean, dry, & intact 2/23/2019  7:22 PM  
Dressing Type Tape;Transparent 2/23/2019  7:19 AM  
Hub Color/Line Status Pink;Capped 2/23/2019  7:19 AM  
Action Taken Blood drawn 2/22/2019  1:15 AM  
   
Peripheral IV 02/22/19 Right;Posterior Wrist (Active) Site Assessment Clean, dry, & intact 2/23/2019  7:22 PM  
Phlebitis Assessment 0 2/23/2019  7:22 PM  
Infiltration Assessment 0 2/23/2019  7:22 PM  
Dressing Status Clean, dry, & intact 2/23/2019  7:22 PM  
Dressing Type Tape;Transparent 2/23/2019  7:19 AM  
Hub Color/Line Status Pink; Infusing 2/23/2019  7:19 AM  
                  
Urinary Catheter Intake & Output Date 02/22/19 1900 - 02/23/19 1069 02/23/19 0700 - 02/24/19 9010 Shift 4668-3049 24 Hour Total 6848-8082 6179-5828 24 Hour Total  
INTAKE  
P.O.   720  720 P. O.   720  720  
I. V.(mL/kg/hr) 757.2 757.2 Cardizem Volume 207.2 207.2 Volume (azithromycin (ZITHROMAX) 500 mg in 0.9% sodium chloride (MBP/ADV) 250 mL) 500 500 Volume (cefTRIAXone (ROCEPHIN) 1 g in 0.9% sodium chloride (MBP/ADV) 50 mL) 50 50 Shift Total(mL/kg) 757. 2(7.5) 757. 2(7.5) 720(7.1)  720(7.1) OUTPUT Urine(mL/kg/hr)  550 850(0.7)  850 Urine Voided  550 850  850 Urine Occurrence(s) 2 x 2 x Shift Total(mL/kg)  550(5.5) 850(8.4)  850(8.4) .2 207.2 -130  -130 Weight (kg) 100.9 100.9 100.9 100.9 100.9 Readmission Risk Assessment Tool Score Low Risk 12 Total Score 3 Has Seen PCP in Last 6 Months (Yes=3, No=0)  
 5 Pt. Coverage (Medicare=5 , Medicaid, or Self-Pay=4) 4 Charlson Comorbidity Score (Age + Comorbid Conditions) Criteria that do not apply:  
 . Living with Significant Other. Assisted Living. LTAC. SNF. or  
Rehab Patient Length of Stay (>5 days = 3) IP Visits Last 12 Months (1-3=4, 4=9, >4=11) Expected Length of Stay 4d 4h Actual Length of Stay 1

## 2019-02-25 ENCOUNTER — PATIENT OUTREACH (OUTPATIENT)
Dept: CARDIOLOGY CLINIC | Age: 79
End: 2019-02-25

## 2019-02-25 ENCOUNTER — DOCUMENTATION ONLY (OUTPATIENT)
Dept: CASE MANAGEMENT | Age: 79
End: 2019-02-25

## 2019-02-25 LAB
ANION GAP SERPL CALC-SCNC: 7 MMOL/L (ref 5–15)
BUN SERPL-MCNC: 11 MG/DL (ref 6–20)
BUN/CREAT SERPL: 16 (ref 12–20)
CALCIUM SERPL-MCNC: 8 MG/DL (ref 8.5–10.1)
CHLORIDE SERPL-SCNC: 104 MMOL/L (ref 97–108)
CO2 SERPL-SCNC: 28 MMOL/L (ref 21–32)
CREAT SERPL-MCNC: 0.7 MG/DL (ref 0.55–1.02)
EST. AVERAGE GLUCOSE BLD GHB EST-MCNC: 154 MG/DL
GLUCOSE BLD STRIP.AUTO-MCNC: 165 MG/DL (ref 65–100)
GLUCOSE BLD STRIP.AUTO-MCNC: 172 MG/DL (ref 65–100)
GLUCOSE BLD STRIP.AUTO-MCNC: 214 MG/DL (ref 65–100)
GLUCOSE BLD STRIP.AUTO-MCNC: 220 MG/DL (ref 65–100)
GLUCOSE BLD STRIP.AUTO-MCNC: 224 MG/DL (ref 65–100)
GLUCOSE SERPL-MCNC: 158 MG/DL (ref 65–100)
HBA1C MFR BLD: 7 % (ref 4.2–6.3)
POTASSIUM SERPL-SCNC: 3.4 MMOL/L (ref 3.5–5.1)
SERVICE CMNT-IMP: ABNORMAL
SODIUM SERPL-SCNC: 139 MMOL/L (ref 136–145)

## 2019-02-25 PROCEDURE — 74011636637 HC RX REV CODE- 636/637: Performed by: INTERNAL MEDICINE

## 2019-02-25 PROCEDURE — 97530 THERAPEUTIC ACTIVITIES: CPT

## 2019-02-25 PROCEDURE — 36415 COLL VENOUS BLD VENIPUNCTURE: CPT

## 2019-02-25 PROCEDURE — 74011000250 HC RX REV CODE- 250: Performed by: INTERNAL MEDICINE

## 2019-02-25 PROCEDURE — 74011250636 HC RX REV CODE- 250/636: Performed by: INTERNAL MEDICINE

## 2019-02-25 PROCEDURE — 74011250636 HC RX REV CODE- 250/636: Performed by: EMERGENCY MEDICINE

## 2019-02-25 PROCEDURE — 77010033678 HC OXYGEN DAILY

## 2019-02-25 PROCEDURE — 65660000000 HC RM CCU STEPDOWN

## 2019-02-25 PROCEDURE — 82962 GLUCOSE BLOOD TEST: CPT

## 2019-02-25 PROCEDURE — 80048 BASIC METABOLIC PNL TOTAL CA: CPT

## 2019-02-25 PROCEDURE — 74011250637 HC RX REV CODE- 250/637: Performed by: INTERNAL MEDICINE

## 2019-02-25 PROCEDURE — 83036 HEMOGLOBIN GLYCOSYLATED A1C: CPT

## 2019-02-25 PROCEDURE — 74011000258 HC RX REV CODE- 258: Performed by: INTERNAL MEDICINE

## 2019-02-25 PROCEDURE — 94640 AIRWAY INHALATION TREATMENT: CPT

## 2019-02-25 PROCEDURE — 74011250637 HC RX REV CODE- 250/637: Performed by: HOSPITALIST

## 2019-02-25 RX ORDER — POTASSIUM CHLORIDE 750 MG/1
40 TABLET, FILM COATED, EXTENDED RELEASE ORAL ONCE
Status: COMPLETED | OUTPATIENT
Start: 2019-02-25 | End: 2019-02-25

## 2019-02-25 RX ORDER — FUROSEMIDE 10 MG/ML
60 INJECTION INTRAMUSCULAR; INTRAVENOUS 2 TIMES DAILY
Status: DISCONTINUED | OUTPATIENT
Start: 2019-02-25 | End: 2019-02-26

## 2019-02-25 RX ORDER — MAGNESIUM SULFATE 100 %
4 CRYSTALS MISCELLANEOUS AS NEEDED
Status: DISCONTINUED | OUTPATIENT
Start: 2019-02-25 | End: 2019-03-02 | Stop reason: HOSPADM

## 2019-02-25 RX ORDER — INSULIN GLARGINE 100 [IU]/ML
12 INJECTION, SOLUTION SUBCUTANEOUS ONCE
Status: COMPLETED | OUTPATIENT
Start: 2019-02-25 | End: 2019-02-25

## 2019-02-25 RX ADMIN — IPRATROPIUM BROMIDE AND ALBUTEROL SULFATE 3 ML: .5; 3 SOLUTION RESPIRATORY (INHALATION) at 13:11

## 2019-02-25 RX ADMIN — CEFTRIAXONE SODIUM 1 G: 1 INJECTION, POWDER, FOR SOLUTION INTRAMUSCULAR; INTRAVENOUS at 05:57

## 2019-02-25 RX ADMIN — POTASSIUM CHLORIDE 40 MEQ: 750 TABLET, EXTENDED RELEASE ORAL at 02:21

## 2019-02-25 RX ADMIN — LEVOTHYROXINE SODIUM 125 MCG: 125 TABLET ORAL at 08:52

## 2019-02-25 RX ADMIN — ENOXAPARIN SODIUM 90 MG: 100 INJECTION, SOLUTION INTRAVENOUS; SUBCUTANEOUS at 08:53

## 2019-02-25 RX ADMIN — INSULIN LISPRO 2 UNITS: 100 INJECTION, SOLUTION INTRAVENOUS; SUBCUTANEOUS at 21:39

## 2019-02-25 RX ADMIN — INSULIN LISPRO 3 UNITS: 100 INJECTION, SOLUTION INTRAVENOUS; SUBCUTANEOUS at 12:33

## 2019-02-25 RX ADMIN — Medication 5 ML: at 14:00

## 2019-02-25 RX ADMIN — ATORVASTATIN CALCIUM 40 MG: 10 TABLET, FILM COATED ORAL at 21:39

## 2019-02-25 RX ADMIN — PANTOPRAZOLE SODIUM 40 MG: 40 TABLET, DELAYED RELEASE ORAL at 08:52

## 2019-02-25 RX ADMIN — IPRATROPIUM BROMIDE AND ALBUTEROL SULFATE 3 ML: .5; 3 SOLUTION RESPIRATORY (INHALATION) at 07:47

## 2019-02-25 RX ADMIN — GUAIFENESIN 600 MG: 600 TABLET, EXTENDED RELEASE ORAL at 17:06

## 2019-02-25 RX ADMIN — POTASSIUM CHLORIDE 40 MEQ: 750 TABLET, EXTENDED RELEASE ORAL at 02:59

## 2019-02-25 RX ADMIN — IPRATROPIUM BROMIDE AND ALBUTEROL SULFATE 3 ML: .5; 3 SOLUTION RESPIRATORY (INHALATION) at 20:41

## 2019-02-25 RX ADMIN — FUROSEMIDE 60 MG: 10 INJECTION, SOLUTION INTRAMUSCULAR; INTRAVENOUS at 17:06

## 2019-02-25 RX ADMIN — GUANFACINE HYDROCHLORIDE 2 MG: 1 TABLET ORAL at 21:40

## 2019-02-25 RX ADMIN — DILTIAZEM HYDROCHLORIDE 5 MG/HR: 5 INJECTION INTRAVENOUS at 11:41

## 2019-02-25 RX ADMIN — INSULIN LISPRO 2 UNITS: 100 INJECTION, SOLUTION INTRAVENOUS; SUBCUTANEOUS at 08:51

## 2019-02-25 RX ADMIN — POTASSIUM CHLORIDE 40 MEQ: 750 TABLET, EXTENDED RELEASE ORAL at 12:34

## 2019-02-25 RX ADMIN — Medication 10 ML: at 05:57

## 2019-02-25 RX ADMIN — ENOXAPARIN SODIUM 90 MG: 100 INJECTION, SOLUTION INTRAVENOUS; SUBCUTANEOUS at 21:38

## 2019-02-25 RX ADMIN — ASPIRIN 81 MG: 81 TABLET, COATED ORAL at 08:52

## 2019-02-25 RX ADMIN — INSULIN LISPRO 3 UNITS: 100 INJECTION, SOLUTION INTRAVENOUS; SUBCUTANEOUS at 17:06

## 2019-02-25 RX ADMIN — FUROSEMIDE 40 MG: 40 TABLET ORAL at 08:52

## 2019-02-25 RX ADMIN — INSULIN GLARGINE 12 UNITS: 100 INJECTION, SOLUTION SUBCUTANEOUS at 02:21

## 2019-02-25 RX ADMIN — LOSARTAN POTASSIUM 100 MG: 100 TABLET, FILM COATED ORAL at 08:52

## 2019-02-25 RX ADMIN — Medication 10 ML: at 21:42

## 2019-02-25 RX ADMIN — POTASSIUM CHLORIDE 40 MEQ: 750 TABLET, EXTENDED RELEASE ORAL at 00:02

## 2019-02-25 RX ADMIN — GUAIFENESIN 600 MG: 600 TABLET, EXTENDED RELEASE ORAL at 08:52

## 2019-02-25 RX ADMIN — AZITHROMYCIN MONOHYDRATE 500 MG: 500 INJECTION, POWDER, LYOPHILIZED, FOR SOLUTION INTRAVENOUS at 02:59

## 2019-02-25 NOTE — PROGRESS NOTES
Transitional Care HUG Note [facility]    Patient: Tori Delgado MRN: [id number]  SSN: [id number]    YOB: 1940  Age: 77 y.o. Sex: female      Admit Date:2/22/19  RRAT-12    The Readmission Risks include:    1. Decompensated HF  2. Diabetes out of control  3. Relapse pneumonia/ sepsis      Nurse Navigator: Anuja Payne is a 77 y.o. female inpatient at [facility] with heart failure . I accessed chart to offer support and placement in the [address]. The Transitional Care Team bridges the gaps in care and education surrounding discharge from the acute care facility. Past Medical History:   Diagnosis Date    Diabetes ([address]     Hypercholesteremia     Hypertension     Thyroid condition        Advanced Care Plan Assessment  Pt has no advanced care plan in her medical record    Persons included in Conversation: patient    Length of ACP Conversation in minutes: 10 minutes    Interventions Provided:  Urged pt to discuss her prognosis with her providers and then discuss with her family any of there health care wishes and who she would choose to be her spokesperson to honor those wishes in case of devastating health care event were to leave her unable to communicate her wishes to the health care team        EDUCATION / INTERVENTIONS OFFERED:        1. Patient / Family was instructed on specific signs/symptoms of heart failure to look for with regards to worsening of their medical conditions. Learning was assessed using teach back in the form of role playing. 2. Patient / Family were given all educational material from the [facility] with services identified for complete wrap around services during their 30 day recovery phase. The patient,  and I discussed wrap around services including nurse navigation, care coordination, home health, transitional care appointments with their primary care provider and specialist team and the role of [facility].      3. Reviewed [facility] heart failure education tool. Long discussion concerning their current disease trajectory. Patient verbalized understanding current goals of care that includes lowering their risks for disease progression. Patient appears motivated  in receiving education. 4. Notified Ambulatory Nurse Navigator, yes      The objective of todays visit was to review the transitional care plan with the patient. We discussed the importance of transitional care as it relates to reducing the likelihood of readmission. We reviewed the goals for the first 30 days following hospital discharge. Patient education focused on readmission zones as described as: The Red Zone: High risk for readmission, days 1-21                          The Yellow Zone: Moderate risk for readmission, days 22-29                          The Green Zone: Lower risk for readmission, days 30 and after    5Delores A. Lev Winn expressed the following barriers to her discharge:   Pt verbalized non working scale at home:     Provided new scale    Eliezer Jha, RN  Nurse Navigator, [address] program   [address]

## 2019-02-25 NOTE — DIABETES MGMT
DTC Progress Note Recommendations/ Comments: Please consider resuming amaryl 4mg ac b (per med rec pt takes at night, however, will likely help BG control better taking it in the am) or beginning lantus 15 units daily if plan to continue to hold OHA's and checking a current a1c. Current hospital DM medication: humalog correction Chart reviewed on Dinora Fernández. Patient is a 77 y.o. female with known Type 2 Diabetes on metformin 850mg ac b/d and amaryl 4mg nightly at home. A1c:  
Lab Results Component Value Date/Time Hemoglobin A1c 7.0 (H) 02/25/2019 03:17 AM  
 
 
Recent Glucose Results:  
Lab Results Component Value Date/Time  (H) 02/25/2019 03:17 AM  
 GLUCPOC 214 (H) 02/25/2019 11:43 AM  
 GLUCPOC 172 (H) 02/25/2019 07:53 AM  
 GLUCPOC 165 (H) 02/25/2019 02:20 AM  
  
 
Lab Results Component Value Date/Time Creatinine 0.70 02/25/2019 03:17 AM  
 
Estimated Creatinine Clearance: 78.8 mL/min (based on SCr of 0.7 mg/dL). Active Orders Diet DIET DIABETIC CONSISTENT CARB Regular PO intake:  
Patient Vitals for the past 72 hrs: 
 % Diet Eaten 02/24/19 1240 75 % 02/24/19 0838 75 % Will continue to follow as needed. Thank you Lesia Quintanilla RD, CDE [facility] Office:  [phone] Time spent: 5 min

## 2019-02-25 NOTE — PROGRESS NOTES
Reason for Admission:  CHF  
                
RRAT Score:     12 Plan for utilizing home health: Home with [address] Likelihood of Readmission:  MODERATE Transition of Care Plan:                   
CM completed room visit with pt. Pt reported that she resides with her son. Pt reported that she is independent with ADLs, and does limited driving. Pt reported that she is active PCP,and she uses a pharm in [employer]. Pt reported no DME at home, and rehab in the past,but no HHC. CM informed pt of recommendations at d/c. Pt reported that she will like [address] at d/c.  CM informed pt of [address] document (signed) placed in chart. CM sent referral to [facility]. CM will inform family of the following. Care Management Interventions PCP Verified by CM: Yes Transition of Care Consult (CM Consult): Discharge Planning, Home Health([facility] ) [facility] - INPATIENT: No 
Reason Outside [facility] Agency Chosen: Unable to staff case Discharge Durable Medical Equipment: No 
Physical Therapy Consult: Yes Occupational Therapy Consult: Yes Speech Therapy Consult: No 
Current Support Network: Relative's Home, Own Home Confirm Follow Up Transport: Family Plan discussed with Pt/Family/Caregiver: Yes Freedom of Choice Offered: Yes Discharge Location Discharge Placement: Home with home health SARAH Chavarria, [address] [facility]

## 2019-02-25 NOTE — PROGRESS NOTES
Problem: Falls - Risk of 
Goal: *Absence of Falls Document Barbara Stevenson Fall Risk and appropriate interventions in the flowsheet. Outcome: Progressing Towards Goal 
Fall Risk Interventions: 
Mobility Interventions: Communicate number of staff needed for ambulation/transfer, Patient to call before getting OOB Medication Interventions: Patient to call before getting OOB, Teach patient to arise slowly Elimination Interventions: Call light in reach, Patient to call for help with toileting needs History of Falls Interventions: Bed/chair exit alarm, Door open when patient unattended, Evaluate medications/consider consulting pharmacy

## 2019-02-25 NOTE — PROGRESS NOTES
NAME: Sara Bourne :  1940 MRN:  [id number] Primary Diagnosis: community acquired pneumonia Date of Face to Face:  2019 2:48 PM        
                        
Face to Face Encounter findings are related to primary reason for home care:   YES 
 
1. I certify that the patient needs intermittent skilled nursing care, physical therapy and/or speech therapy. I will not be following this patient in the Community and Dr. Malik Mancilla, Provider will be responsible for signing the [address] 2. Initial Orders for Care: Physical Therapy 3. I certify that this patient is homebound because of illness or injury, need the aid of supportive devices such as crutches, canes, wheelchairs, and walkers; the use of special transportation; or the assistance of another person in order to leave their place of residence. There exists a normal inability to leave home and leaving home requires a considerable and taxing effort. 4. I certify that this patient is under my care and that I had a Face-to-Face Encounter that meets the physician Face-to-Face Encounter requirements. Document the physical findings from the [address] [place] [address] Encounter that support the need for skilled services: Has new finding of weakness and altered mobility that requires skilled physical/occupational and/or speech therapy services for evaluation and interventions. DO Michael Pinon Physician Office: [phone] Fax:   [phone]

## 2019-02-25 NOTE — ROUTINE PROCESS
K 2.9 on morning labs, not replaced. Received lasix after morning Labs, will recheck potassium before replacement.

## 2019-02-25 NOTE — PROGRESS NOTES
[facility] met with patient today to provide an introduction to self, the [address] at [facility]. Bundled Payment Care Program: 
 
Patient was provided a copy of the [facility] letter. Patient states that they do not have a functioning scale at home. Patient was provided a scale during this visit. Reinforced the importance of checking their weight at the same time daily and calling the cardiologist if their weight is up 3 lbs. in a day or 5 lbs. in a week (or per MD guidelines). Patient  has not received a \"Living with Heart Failure\" patient education book. [employer] Program: 
 
Provided patient demonstration of [employer] program on training iPad. Patient was not interested in the [employer] program.  
Patient not interested in the technology of the program. 
 
Appointments:  
 
Patient stated best day(s) of the week for provider appointment(s): anyday Patient stated best time of day for provider appointment(s):  PM

## 2019-02-25 NOTE — PROGRESS NOTES
Hospitalist Progress Note NAME: Shoshana Carias :  1940 MRN:  [id number] Assessment / Plan: 
CAP Cont IV abx with azithro/ceftriaxone Follow-up sputum culture Improving slowly, still some wheezing 
cont home PO lasix Echocardiogram 2019 
nL LV function, mild LVH, severe RA dilitation Cardiology consultation appreciated 
troponins negative, no chest pain DM Cont SSI w POCs Hold home metformin and glimeperide Hypertension Continue home  Cozaar 100 mg daily Hypothyroidism Continue Synthyroid 125 mcg daily Elevated TSH noted FT4 nL, repeat TSH outpatient once acute medical issues addressed Hyperlipidemia Continue simvastatin 80 mg daily 
  
AF with RVR  
give one dose Lovenox  
cardio consult  
start on Cardizem drip, defer to cardio on rate control On metoprolol at home Chadsvasc 5, resume Tx dose lovenox, consider NOAC if covered 
  
  
Code Status: Full Surrogate Decision Maker: 
Abdullahi Fernández  
  
  
DVT Prophylaxis: Lovenox GI Prophylaxis: not indicated 
  
Baseline: independent Subjective: Chief Complaint / Reason for Physician Visit Breathing improved. Denies CP/palpitations. Discussed with RN events overnight. Review of Systems: 
Symptom Y/N Comments  Symptom Y/N Comments Fever/Chills n   Chest Pain n   
Poor Appetite    Edema n   
Cough n   Abdominal Pain Sputum n   Joint Pain SOB/STARR y   Pruritis/Rash Nausea/vomit n   Tolerating PT/OT Diarrhea n   Tolerating Diet y Constipation n   Other Could NOT obtain due to:   
 
Objective: VITALS:  
Last 24hrs VS reviewed since prior progress note. Most recent are: 
Patient Vitals for the past 24 hrs: 
 Temp Pulse Resp BP SpO2  
19 1912     98 % 19 1601 98.4 °F (36.9 °C) 82 20 113/68 99 % 19 1130 98.1 °F (36.7 °C) 93 20 152/73 99 % 19 0901     98 % 19 0838     100 % 02/24/19 0749 98.1 °F (36.7 °C) 83 16 132/73 97 % 02/24/19 0300 98 °F (36.7 °C) 88 16 138/73 98 % 02/23/19 2325 97.8 °F (36.6 °C) 98 16 135/60 99 % 02/23/19 2300     98 % Intake/Output Summary (Last 24 hours) at 2/24/2019 6324 Last data filed at 2/24/2019 1345 Gross per 24 hour Intake 787.75 ml Output 902 ml Net -114.25 ml PHYSICAL EXAM: 
General: WD, WN. Alert, cooperative, no acute distress   
EENT:  EOMI. Anicteric sclerae. MMM Resp:  CTA bilaterally, no wheezing or rales. No accessory muscle use CV:  Regular  rhythm,  No edema GI:  Soft, Non distended, Non tender.  +Bowel sounds Neurologic:  Alert and oriented X 3, normal speech, Psych:   Good insight. Not anxious nor agitated Skin:  No rashes. No jaundice Reviewed most current lab test results and cultures  YES Reviewed most current radiology test results   YES Review and summation of old records today    NO Reviewed patient's current orders and MAR    YES 
PMH/ reviewed - no change compared to H&P 
________________________________________________________________________ Care Plan discussed with: 
  Comments Patient x Family  x   
RN x Care Manager Consultant Multidiciplinary team rounds were held today with , nursing, pharmacist and clinical coordinator. Patient's plan of care was discussed; medications were reviewed and discharge planning was addressed. ________________________________________________________________________ Total NON critical care TIME:  25   Minutes Total CRITICAL CARE TIME Spent:   Minutes non procedure based Comments >50% of visit spent in counseling and coordination of care x   
________________________________________________________________________ Radha Coelho DO  
 
Procedures: see electronic medical records for all procedures/Xrays and details which were not copied into this note but were reviewed prior to creation of Plan. LABS: 
I reviewed today's most current labs and imaging studies. Pertinent labs include: 
Recent Labs  
  02/24/19 
0543 02/22/19 
0116 WBC 5.6 6.9 HGB 10.4* 11.7 HCT 31.6* 35.4  191 Recent Labs  
  02/24/19 
0543 02/22/19 
0116  134* K 2.9* 3.4*  
 99 CO2 25 26 * 232* BUN 10 11 CREA 0.69 0.84 CA 7.8* 8.3* ALB 3.0* 3.6 TBILI 0.9 1.7* SGOT 27 34 ALT 21 22 Signed: Freddie Perrin, DO

## 2019-02-25 NOTE — PROGRESS NOTES
Progress Note 2/25/2019 10:15 AM 
NAME: Daniela Benavides MRN:  [id number] Admit Diagnosis: CHF (congestive heart failure) ([facility] [I50.9] Assessment:  
 
 
Admitted with pneumonia -  Primary diagnosis. Mild pulmonary edema associated. Chronic Atrial Fibrillation . peripheral edema. Diabetes HTN Hyperlipidemia. Elevated TSH   ? Hypothyroid. Echo:  
· Estimated left ventricular ejection fraction is 51 - 55%. Left ventricular mild concentric hypertrophy. · Left atrial cavity size is moderately dilated. · Right atrial cavity size is severely dilated. · Mild aortic valve leaflet calcification present. Mild aortic valve stenosis is present. Mild aortic valve regurgitation is present. · Mitral valve thickening. Moderate to severe mitral valve regurgitation. · Moderate tricuspid valve regurgitation is present. 2/23   OOB in chair. Says she is feeling better today. Lungs still have significant diffuse wheezing. Mild edema. As noted , echo shows normal LV function. 2/25 Feeling better. Lungs are clear to auscultation. Ankles still have some edema OOB in chair , not on O2. Plan:  
 
Continue current regimen. IV diuresis [x]        High complexity decision making was performed Subjective:  
 
Dinora Davis Ranks denies chest pain, dyspnea. Discussed with RN events overnight. Patient Active Problem List  
Diagnosis Code  CHF (congestive heart failure) ([facility]) I50.9  Pneumonia J18.9 Review of Systems: 
 
Symptom Y/N Comments  Symptom Y/N Comments Fever/Chills N   Chest Pain N Poor Appetite N   Edema N   
Cough N   Abdominal Pain N Sputum N   Joint Pain N   
SOB/STARR N   Pruritis/Rash N   
Nausea/vomit N   Tolerating PT/OT Y Diarrhea N   Tolerating Diet Y Constipation N   Other Could NOT obtain due to:   
 
Objective:  
  
Physical Exam: 
 
Last 24hrs VS reviewed since prior progress note. Most recent are: Visit Vitals /71 Pulse 84 Temp 97.8 °F (36.6 °C) Resp 18 Ht 5' 8\" (1.727 m) Wt 95.7 kg (211 lb) SpO2 93% BMI 32.08 kg/m² Intake/Output Summary (Last 24 hours) at 2/25/2019 1101 Last data filed at 2/25/2019 2095 Gross per 24 hour Intake 891.67 ml Output 1300 ml Net -408.33 ml General Appearance: Well developed, well nourished, alert & oriented x 3,  
 no acute distress. Ears/Nose/Mouth/Throat: Hearing grossly normal. 
Neck: Supple. Chest: Lungs clear to auscultation bilaterally. Cardiovascular: Regular rate and rhythm, S1S2 normal, no murmur. Abdomen: Soft, non-tender, bowel sounds are active. Extremities: No edema bilaterally. Skin: Warm and dry. PMH/SH reviewed - no change compared to H&P Data Review Telemetry: normal sinus rhythm Lab Data Personally Reviewed: 
 
Recent Labs  
  02/24/19 
0543 WBC 5.6 HGB 10.4* HCT 31.6*  
 LABRCNT(INR:3,PTP:3,APTT:3,) Recent Labs  
  02/25/19 
0317 02/24/19 
2155 02/24/19 
0543   --  139  
K 3.4* 2.9* 2.9*  
  --  104 CO2 28  --  25 BUN 11  --  10  
CREA 0.70  --  0.69 *  --  210* CA 8.0*  --  7.8* LABRCNT(CPK:3,CpKMB:3,ckndx:3,troiq:3)No results found for: CHOL, CHOLX, CHLST, CHOLV, HDL, LDL, LDLC, DLDLP, TGLX, TRIGL, TRIGP, CHHD, CHHDXLABRCNT(sgot:3,gpt:3,ap:3,tbiL:3,TP:3,ALB:3,GLOB:3,ggt:3,aml:3,amyp:3,lpse:3,hlpse:3)No results for input(s): PH, PCO2, PO2 in the last 72 hours. No results found for: CHOL, CHOLX, CHLST, CHOLV, HDL, LDL, LDLC, DLDLP, TGLX, TRIGL, TRIGP, CHHD, CHHDXMEDTABLEAntione Albarran MD 
No results for input(s): PH, PCO2, PO2 in the last 72 hours. Medications Personally Reviewed: 
 
Current Facility-Administered Medications Medication Dose Route Frequency  glucose chewable tablet 16 g  4 Tab Oral PRN  
 furosemide (LASIX) injection 60 mg  60 mg IntraVENous BID  enoxaparin (LOVENOX) injection 90 mg  1 mg/kg SubCUTAneous Q12H  albuterol-ipratropium (DUO-NEB) 2.5 MG-0.5 MG/3 ML  3 mL Nebulization TID RT  
 guaiFENesin ER (MUCINEX) tablet 600 mg  600 mg Oral BID  
 azithromycin (ZITHROMAX) 500 mg in 0.9% sodium chloride (MBP/ADV) 250 mL  500 mg IntraVENous Q24H  
 glucose chewable tablet 16 g  4 Tab Oral PRN  
 dextrose (D50W) injection syrg 12.5-25 g  25-50 mL IntraVENous PRN  
 glucagon (GLUCAGEN) injection 1 mg  1 mg IntraMUSCular PRN  
 insulin lispro (HUMALOG) injection   SubCUTAneous AC&HS  atorvastatin (LIPITOR) tablet 40 mg  40 mg Oral EVERY OTHER DAY  losartan (COZAAR) tablet 100 mg  100 mg Oral DAILY  guanFACINE IR (TENEX) tablet 2 mg  2 mg Oral QHS  
 HYDROcodone-acetaminophen (NORCO) 5-325 mg per tablet 0.5 Tab  0.5 Tab Oral Q6H PRN  
 aspirin delayed-release tablet 81 mg  81 mg Oral DAILY  pantoprazole (PROTONIX) tablet 40 mg  40 mg Oral ACB  levothyroxine (SYNTHROID) tablet 125 mcg  125 mcg Oral ACB  sodium chloride (NS) flush 5-40 mL  5-40 mL IntraVENous Q8H  
 sodium chloride (NS) flush 5-40 mL  5-40 mL IntraVENous PRN  
 acetaminophen (TYLENOL) tablet 650 mg  650 mg Oral Q6H PRN  
 ondansetron (ZOFRAN) injection 4 mg  4 mg IntraVENous Q8H PRN  
 bisacodyl (DULCOLAX) tablet 5 mg  5 mg Oral DAILY PRN  
 cefTRIAXone (ROCEPHIN) 1 g in 0.9% sodium chloride (MBP/ADV) 50 mL  1 g IntraVENous Q24H  
 dilTIAZem (CARDIZEM) 125 mg in dextrose 5% 125 mL infusion  0-15 mg/hr IntraVENous TITRATE Maci Fisher MD

## 2019-02-25 NOTE — PROGRESS NOTES
Problem: Mobility Impaired (Adult and Pediatric) Goal: *Acute Goals and Plan of Care (Insert Text) Physical Therapy Goals Initiated 2/22/2019 1. Patient will move from supine to sit and sit to supine , scoot up and down and roll side to side in bed with independence within 7 day(s). 2.  Patient will transfer from bed to chair and chair to bed with independence using the least restrictive device within 7 day(s). 3.  Patient will perform sit to stand with independence within 7 day(s). 4.  Patient will ambulate with modified independence for 150 feet with the least restrictive device within 7 day(s). 5.  Patient will ascend/descend 5 stairs with B handrail(s) with minimal assistance/contact guard assist within 7 day(s). physical Therapy TREATMENT Patient: Angi Washington (77 y.o. female) Date: 2/25/2019 Diagnosis: CHF (congestive heart failure) ([facility]) [I50.9] Pneumonia Precautions:   
Chart, physical therapy assessment, plan of care and goals were reviewed. ASSESSMENT: 
Pt received sitting in bedside chair and agreeable to PT intervention. Pt cleared by nursing for mobility. Patient currently in a-fib with -138 bpm at rest. No complaints. Sit<>stand transfers performed x 2 with SBA overall, however pt needing education and cueing for safe hand placement with use of RW. Attempted to have patient ambulate x 2, once with initial mobility and again after seated break, however -160s bpm with minimal activity. Once seated, -130s bpm at rest. Further gait training aborted due to elevated HR. Pt donned/doffed shoes with supervision, demonstrating intact sitting balance, however  bpm with this activity. Standing balance intact overall with RW support. Pt reinforces that she is never alone and has good support from family. Reviewed seated exercises as noted below and encouraged patient to complete hourly to improve BLE strength and decreased edema.  Pt was left sitting in bedside chair with all needs met, VSS, and RN aware following session. Recommend pt return home with HHPT and initial 24/7 supervision/assist at discharge. Progression toward goals: 
[]    Improving appropriately and progressing toward goals [x]    Improving slowly and progressing toward goals 
[]    Not making progress toward goals and plan of care will be adjusted PLAN: 
Patient continues to benefit from skilled intervention to address the above impairments. Continue treatment per established plan of care. Discharge Recommendations:  Home Health and initial 24/7 assistance/supervision Further Equipment Recommendations for Discharge:  None SUBJECTIVE:  
Patient stated I want to wait for the doctors.  OBJECTIVE DATA SUMMARY:  
Critical Behavior: 
Neurologic State: Alert Orientation Level: Oriented X4 Cognition: Appropriate decision making, Appropriate for age attention/concentration, Appropriate safety awareness, Follows commands Functional Mobility Training: 
Bed Mobility: 
 Received and returned to recliner chair Transfers: 
Sit to Stand: Stand-by assistance Stand to Sit: Stand-by assistance Bed to Chair: Contact guard assistance Balance: 
Sitting: Intact Standing: Impaired; With support Standing - Static: Good;Constant support Standing - Dynamic : Good(with RW support)Ambulation/Gait Training: 
Distance (ft): 7 Feet (ft) Assistive Device: Gait belt;Walker, rolling Ambulation - Level of Assistance: Contact guard assistance;Assist x1;Additional time; Adaptive equipment Gait Abnormalities: Decreased step clearance Speed/Liv: Slow;Shuffled Step Length: Left shortened;Right shortened Therapeutic Exercises:  
Reviewed LAQ, seated hip flexion, and ankle pumps Pain: 
Pain Scale 1: Numeric (0 - 10) Pain Intensity 1: 0 Activity Tolerance: Fair/limited - pt asymptomatic, however -160 bpm with minimal activity; no pain complaints Please refer to the flowsheet for vital signs taken during this treatment. After treatment:  
[x]    Patient left in no apparent distress sitting up in chair 
[]    Patient left in no apparent distress in bed 
[x]    Call bell left within reach [x]    Nursing notified 
[]    Caregiver present 
[]    Bed alarm activated COMMUNICATION/COLLABORATION:  
The patients plan of care was discussed with: Physical Therapist and Registered Nurse Melody Braswell PT, DPT Time Calculation: 28 mins

## 2019-02-25 NOTE — PROGRESS NOTES
PCU SHIFT NURSING NOTE Bedside and Verbal shift change report given to Skuldtechlaurel Susnhine RN (oncoming nurse) by Jessica Cota RN (offgoing nurse). Report included the following information SBAR, Kardex, Intake/Output, MAR, Accordion, Recent Results, Med Rec Status, Cardiac Rhythm A Fib and Alarm Parameters . Shift Summary:  
 
0230 - Patient's hr on monitor fluctuating between 59 and 70 on monitor. Cardizem drip dropped from 15 to 10 mg/hr. Patient's hr still dropping as low as 59 15 minutes later. Cardizem dropped to 2.5 mg/hr. Will continue to monitor. Admission Date 2/22/2019 Admission Diagnosis CHF (congestive heart failure) ([facility] [I50.9] Consults IP CONSULT TO CARDIOLOGY Consults [x]PT [x]OT []Speech [x]Case Management  
  
[] Palliative Cardiac Monitoring Order  
[x]Yes []No  
 
IV drips [x]Yes Drip: Cardizem           Dose: 0-15mg Drip:                            Dose: 
Drip:                            Dose:  
[]No  
 
GI Prophylaxis [x]Yes []No  
 
 
 
DVT Prophylaxis SCDs:  Sequential Compression Device: Bilateral  
     
 Juan stockings:     
  
[x] Medication []Contraindicated []None Activity Level Activity Level: Up with Assistance Activity Assistance: Partial (one person) Purposeful Rounding every 1-2 hour? [x]Yes Turner Score  Total Score: 3 Bed Alarm (If score 3 or >) [x]Yes  
[] Refused (See signed refusal form in chart) Fabricio Score  Fabricio Score: 17 Fabricio Score (if score 14 or less) []PMT consult  
[]Wound Care consult []Specialty bed  
[] Nutrition consult Needs prior to discharge:  
Home O2 required:   
[]Yes  
[x]No  
 If yes, how much O2 required? Other:  
 Last Bowel Movement: Last Bowel Movement Date: 02/24/19 Influenza Vaccine Received Flu Vaccine for Current Season (usually Sept-March): Yes Pneumonia Vaccine Diet Active Orders Diet DIET DIABETIC CONSISTENT CARB Regular LDAs              
Peripheral IV 02/22/19 Right;Posterior Wrist (Active) Site Assessment Clean, dry, & intact 2/24/2019  7:33 PM  
Phlebitis Assessment 0 2/24/2019  7:33 PM  
Infiltration Assessment 0 2/24/2019  7:33 PM  
Dressing Status Clean, dry, & intact 2/24/2019  7:33 PM  
Dressing Type Tape;Transparent 2/24/2019  7:49 AM  
Hub Color/Line Status Pink; Infusing 2/24/2019  7:49 AM  
                  
Urinary Catheter Intake & Output Date 02/23/19 1900 - 02/24/19 9484 02/24/19 0700 - 02/25/19 8415 Shift 2862-4023 24 Hour Total 6032-9902 7581-2729 24 Hour Total  
INTAKE  
P.O.  720 480  480  
  P. O.  720 480  480  
I. V.(mL/kg/hr) 307.8 307.8  548. 3 548. 3 Cardizem Volume 257.8 257.8  248.3 248. 3 Volume (azithromycin (ZITHROMAX) 500 mg in 0.9% sodium chloride (MBP/ADV) 250 mL)    250 250 Volume (cefTRIAXone (ROCEPHIN) 1 g in 0.9% sodium chloride (MBP/ADV) 50 mL) 50 50  50 50 Shift Total(mL/kg) 307.8(3.4) 1027. 8(11.3) 480(5.3) 548. 3(6) 1028. 3(11.3) OUTPUT Urine(mL/kg/hr) 2 852 900(0.8)  900 Urine Voided 2 852 900  900 Urine Occurrence(s) 4 x 4 x Shift Total(mL/kg) 2(0) 852(9.4) 900(9.9)  900(9.9) .8 175.8 -420 548. 3 128. 3 Weight (kg) 90.8 90.8 90.8 90.8 90.8 Readmission Risk Assessment Tool Score Low Risk 12 Total Score 3 Has Seen PCP in Last 6 Months (Yes=3, No=0)  
 5 Pt. Coverage (Medicare=5 , Medicaid, or Self-Pay=4) 4 Charlson Comorbidity Score (Age + Comorbid Conditions) Criteria that do not apply:  
 . Living with Significant Other. Assisted Living. LTAC. SNF. or  
Rehab Patient Length of Stay (>5 days = 3) IP Visits Last 12 Months (1-3=4, 4=9, >4=11) Expected Length of Stay 4d 4h Actual Length of Stay 2

## 2019-02-26 LAB
GLUCOSE BLD STRIP.AUTO-MCNC: 171 MG/DL (ref 65–100)
GLUCOSE BLD STRIP.AUTO-MCNC: 176 MG/DL (ref 65–100)
GLUCOSE BLD STRIP.AUTO-MCNC: 226 MG/DL (ref 65–100)
GLUCOSE BLD STRIP.AUTO-MCNC: 235 MG/DL (ref 65–100)
SERVICE CMNT-IMP: ABNORMAL

## 2019-02-26 PROCEDURE — 74011636637 HC RX REV CODE- 636/637: Performed by: INTERNAL MEDICINE

## 2019-02-26 PROCEDURE — 74011250636 HC RX REV CODE- 250/636: Performed by: EMERGENCY MEDICINE

## 2019-02-26 PROCEDURE — 74011250637 HC RX REV CODE- 250/637: Performed by: HOSPITALIST

## 2019-02-26 PROCEDURE — 94640 AIRWAY INHALATION TREATMENT: CPT

## 2019-02-26 PROCEDURE — 74011250636 HC RX REV CODE- 250/636: Performed by: INTERNAL MEDICINE

## 2019-02-26 PROCEDURE — 65660000000 HC RM CCU STEPDOWN

## 2019-02-26 PROCEDURE — 74011000258 HC RX REV CODE- 258: Performed by: INTERNAL MEDICINE

## 2019-02-26 PROCEDURE — 74011000250 HC RX REV CODE- 250: Performed by: INTERNAL MEDICINE

## 2019-02-26 PROCEDURE — 82962 GLUCOSE BLOOD TEST: CPT

## 2019-02-26 PROCEDURE — 74011250637 HC RX REV CODE- 250/637: Performed by: INTERNAL MEDICINE

## 2019-02-26 RX ORDER — POLYETHYLENE GLYCOL 3350 17 G/17G
17 POWDER, FOR SOLUTION ORAL DAILY
Status: DISCONTINUED | OUTPATIENT
Start: 2019-02-26 | End: 2019-03-02 | Stop reason: HOSPADM

## 2019-02-26 RX ORDER — POLYETHYLENE GLYCOL 3350 17 G/17G
17 POWDER, FOR SOLUTION ORAL DAILY
Status: DISCONTINUED | OUTPATIENT
Start: 2019-02-27 | End: 2019-02-26

## 2019-02-26 RX ORDER — METOPROLOL SUCCINATE 50 MG/1
50 TABLET, EXTENDED RELEASE ORAL DAILY
Status: DISCONTINUED | OUTPATIENT
Start: 2019-02-26 | End: 2019-02-27

## 2019-02-26 RX ORDER — FUROSEMIDE 40 MG/1
40 TABLET ORAL DAILY
Status: DISCONTINUED | OUTPATIENT
Start: 2019-02-27 | End: 2019-03-02 | Stop reason: HOSPADM

## 2019-02-26 RX ORDER — IPRATROPIUM BROMIDE AND ALBUTEROL SULFATE 2.5; .5 MG/3ML; MG/3ML
3 SOLUTION RESPIRATORY (INHALATION)
Status: DISCONTINUED | OUTPATIENT
Start: 2019-02-26 | End: 2019-02-27

## 2019-02-26 RX ORDER — DOCUSATE SODIUM 100 MG/1
100 CAPSULE, LIQUID FILLED ORAL
Status: DISCONTINUED | OUTPATIENT
Start: 2019-02-26 | End: 2019-03-02 | Stop reason: HOSPADM

## 2019-02-26 RX ADMIN — Medication 10 ML: at 17:41

## 2019-02-26 RX ADMIN — Medication 10 ML: at 21:39

## 2019-02-26 RX ADMIN — Medication 10 ML: at 05:14

## 2019-02-26 RX ADMIN — AZITHROMYCIN MONOHYDRATE 500 MG: 500 INJECTION, POWDER, LYOPHILIZED, FOR SOLUTION INTRAVENOUS at 04:05

## 2019-02-26 RX ADMIN — ASPIRIN 81 MG: 81 TABLET, COATED ORAL at 09:33

## 2019-02-26 RX ADMIN — GUAIFENESIN 600 MG: 600 TABLET, EXTENDED RELEASE ORAL at 09:33

## 2019-02-26 RX ADMIN — GUAIFENESIN 600 MG: 600 TABLET, EXTENDED RELEASE ORAL at 17:41

## 2019-02-26 RX ADMIN — PANTOPRAZOLE SODIUM 40 MG: 40 TABLET, DELAYED RELEASE ORAL at 08:30

## 2019-02-26 RX ADMIN — POLYETHYLENE GLYCOL 3350 17 G: 17 POWDER, FOR SOLUTION ORAL at 18:27

## 2019-02-26 RX ADMIN — CEFTRIAXONE SODIUM 1 G: 1 INJECTION, POWDER, FOR SOLUTION INTRAMUSCULAR; INTRAVENOUS at 05:13

## 2019-02-26 RX ADMIN — GUANFACINE HYDROCHLORIDE 2 MG: 1 TABLET ORAL at 21:37

## 2019-02-26 RX ADMIN — ENOXAPARIN SODIUM 90 MG: 100 INJECTION, SOLUTION INTRAVENOUS; SUBCUTANEOUS at 21:38

## 2019-02-26 RX ADMIN — INSULIN LISPRO 2 UNITS: 100 INJECTION, SOLUTION INTRAVENOUS; SUBCUTANEOUS at 21:38

## 2019-02-26 RX ADMIN — LOSARTAN POTASSIUM 100 MG: 100 TABLET, FILM COATED ORAL at 09:33

## 2019-02-26 RX ADMIN — FUROSEMIDE 60 MG: 10 INJECTION, SOLUTION INTRAMUSCULAR; INTRAVENOUS at 09:34

## 2019-02-26 RX ADMIN — LEVOTHYROXINE SODIUM 125 MCG: 125 TABLET ORAL at 08:30

## 2019-02-26 RX ADMIN — IPRATROPIUM BROMIDE AND ALBUTEROL SULFATE 3 ML: .5; 3 SOLUTION RESPIRATORY (INHALATION) at 08:41

## 2019-02-26 RX ADMIN — DILTIAZEM HYDROCHLORIDE 15 MG/HR: 5 INJECTION INTRAVENOUS at 09:50

## 2019-02-26 RX ADMIN — METOPROLOL SUCCINATE 50 MG: 50 TABLET, EXTENDED RELEASE ORAL at 11:13

## 2019-02-26 RX ADMIN — DOCUSATE SODIUM 100 MG: 100 CAPSULE, LIQUID FILLED ORAL at 18:06

## 2019-02-26 RX ADMIN — INSULIN LISPRO 3 UNITS: 100 INJECTION, SOLUTION INTRAVENOUS; SUBCUTANEOUS at 12:57

## 2019-02-26 RX ADMIN — ENOXAPARIN SODIUM 90 MG: 100 INJECTION, SOLUTION INTRAVENOUS; SUBCUTANEOUS at 09:33

## 2019-02-26 RX ADMIN — BISACODYL 5 MG: 5 TABLET, COATED ORAL at 17:55

## 2019-02-26 RX ADMIN — INSULIN LISPRO 2 UNITS: 100 INJECTION, SOLUTION INTRAVENOUS; SUBCUTANEOUS at 08:30

## 2019-02-26 RX ADMIN — DILTIAZEM HYDROCHLORIDE 10 MG/HR: 5 INJECTION INTRAVENOUS at 12:02

## 2019-02-26 NOTE — PROGRESS NOTES
Call placed to Dr Melida Arce as difficult to control pt's heart rate. Pt states after activity her B/P is always high.

## 2019-02-26 NOTE — PROGRESS NOTES
Hospitalist Progress Note NAME: Elke Willingham :  1940 MRN:  [id number] Assessment / Plan: 
CAP Cont IV abx with azithro/ceftriaxone Follow-up sputum culture Improving slowly, still some wheezing 
cont home PO lasix Echocardiogram 2019 
nL LV function, mild LVH, severe RA dilitation Cardiology consultation appreciated 
troponins negative, no chest pain AF with RVR  
lovenox in ED 
cardio consult Still on cardizem gtt, defer to cardio on rate control On metoprolol at home Chadsvasc 5, resume Tx dose lovenox, consider NOAC if covered DM Cont SSI w POCs Hold home metformin and glimeperide Hypertension Continue home  Cozaar 100 mg daily Hypothyroidism Continue Synthyroid 125 mcg daily Elevated TSH noted FT4 nL, repeat TSH outpatient once acute medical issues addressed Hyperlipidemia Continue simvastatin 80 mg daily 
  
 
Code Status: Full Surrogate Decision Maker: 
Abdullahi Fernández  
  
  
DVT Prophylaxis: Lovenox GI Prophylaxis: not indicated 
  
Baseline: independent Subjective: Chief Complaint / Reason for Physician Visit Sitting in bedside chair. sats ok on RA at rest. Pt tachy with movement Discussed with RN events overnight. Review of Systems: 
Symptom Y/N Comments  Symptom Y/N Comments Fever/Chills n   Chest Pain n   
Poor Appetite    Edema n   
Cough n   Abdominal Pain Sputum n   Joint Pain SOB/STARR y   Pruritis/Rash Nausea/vomit n   Tolerating PT/OT Diarrhea n   Tolerating Diet y Constipation n   Other Could NOT obtain due to:   
 
Objective: VITALS:  
Last 24hrs VS reviewed since prior progress note. Most recent are: 
Patient Vitals for the past 24 hrs: 
 Temp Pulse Resp BP SpO2  
19 2138  95  (!) 131/91   
19 2040 97.7 °F (36.5 °C) 90 16 113/61 98 % 19 2039     97 % 19 1535  98   98 % 19 1534 97.8 °F (36.6 °C) (!) 105 18 138/70 98 % 02/25/19 1131 98 °F (36.7 °C) (!) 110 18 158/74 97 % 02/25/19 0800     97 % 02/25/19 0732 97.8 °F (36.6 °C) 84 18 131/71 93 % 02/25/19 0304 98 °F (36.7 °C) 82 18 102/70 97 % 02/24/19 2350 98.1 °F (36.7 °C) 95 16 138/54 98 % Intake/Output Summary (Last 24 hours) at 2/25/2019 2249 Last data filed at 2/25/2019 1900 Gross per 24 hour Intake 781.55 ml Output 1000 ml Net -218.45 ml PHYSICAL EXAM: 
General: WD, WN. Alert, cooperative, no acute distress   
EENT:  EOMI. Anicteric sclerae. MMM Resp:  CTA bilaterally, no wheezing or rales. No accessory muscle use CV:  Regular  rhythm,  No edema GI:  Soft, Non distended, Non tender.  +Bowel sounds Neurologic:  Alert and oriented X 3, normal speech, Psych:   Good insight. Not anxious nor agitated Skin:  No rashes. No jaundice Reviewed most current lab test results and cultures  YES Reviewed most current radiology test results   YES Review and summation of old records today    NO Reviewed patient's current orders and MAR    YES 
PMH/SH reviewed - no change compared to H&P 
________________________________________________________________________ Care Plan discussed with: 
  Comments Patient x Family RN x Care Manager Consultant Multidiciplinary team rounds were held today with , nursing, pharmacist and clinical coordinator. Patient's plan of care was discussed; medications were reviewed and discharge planning was addressed. ________________________________________________________________________ Total NON critical care TIME:  25   Minutes Total CRITICAL CARE TIME Spent:   Minutes non procedure based Comments >50% of visit spent in counseling and coordination of care x   
________________________________________________________________________ Layne Perez DO  
 
Procedures: see electronic medical records for all procedures/Xrays and details which were not copied into this note but were reviewed prior to creation of Plan. LABS: 
I reviewed today's most current labs and imaging studies. Pertinent labs include: 
Recent Labs  
  02/24/19 
0543 WBC 5.6 HGB 10.4* HCT 31.6*  
 Recent Labs  
  02/25/19 
0317 02/24/19 2155 02/24/19 
0543   --  139  
K 3.4* 2.9* 2.9*  
  --  104 CO2 28  --  25 *  --  210* BUN 11  --  10  
CREA 0.70  --  0.69 CA 8.0*  --  7.8* ALB  --   --  3.0* TBILI  --   --  0.9 SGOT  --   --  27 ALT  --   --  21 Signed: Jay Magana,

## 2019-02-26 NOTE — PROGRESS NOTES
Spiritual Care Assessment/Progress Note [place] 
 
 
NAME: Monica Watkins      MRN: [id number] AGE: 77 y.o. SEX: female Yarsanism Affiliation: Non Adventist  
Language: English  
 
2/26/2019     Total Time (in minutes): 11 Spiritual Assessment begun in [facility] PROGRESSIVE CARE through conversation with: 
  
    [x]Patient        [] Family    [] Friend(s) Reason for Consult: Initial/Spiritual assessment, patient floor Spiritual beliefs: (Please include comment if needed) [x] Identifies with a kendra tradition:     
   [] Supported by a kendra community:        
   [] Claims no spiritual orientation:       
   [] Seeking spiritual identity:            
   [] Adheres to an individual form of spirituality:       
   [] Not able to assess:                   
 
    
Identified resources for coping:  
   [x] Prayer                           
   [] Music                  [] Guided Imagery [x] Family/friends                 [] Pet visits [] Devotional reading                         [] Unknown 
   [] Other:                                          
 
 
Interventions offered during this visit: (See comments for more details) Patient Interventions: Initial/Spiritual assessment, patient floor, Affirmation of emotions/emotional suffering, Affirmation of kendra, Coping skills reviewed/reinforced, Normalization of emotional/spiritual concerns, Prayer (assurance of), Prayer (actual), Yarsanism beliefs/image of God discussed, Spiritual materials provided (comment)(prayer quilt) Plan of Care: 
 
 [] Support spiritual and/or cultural needs  
 [] Support AMD and/or advance care planning process    
 [] Support grieving process 
 [] Coordinate Rites and/or Rituals  
 [] Coordination with community clergy [] No spiritual needs identified at this time 
 [] Detailed Plan of Care below (See Comments)  [] Make referral to Music Therapy [] Make referral to Pet Therapy    
[] Make referral to Addiction services 
[] Make referral to [facility] 
[] Make referral to Spiritual Care Partner 
[] No future visits requested       
[x] Follow up visits as needed Comments:  
Initial spiritual assessment in Progressive Care. Patient/family shared about concerns. Provided presence of ministry and prayer quilt. Consulted with patient and nurse. Advised of  Availability. [address]  Paging Service [phone]

## 2019-02-26 NOTE — ACP (ADVANCE CARE PLANNING)
Advance Care Planning (ACP) Provider Note - Comprehensive     Date of ACP Conversation: 02/26/19  Persons included in Conversation:  patient  Length of ACP Conversation in minutes:  20 minutes    Authorized Decision Maker (if patient is incapable of making informed decisions): This person is: Other Legally Authorized Decision Maker (e.g. Next of Kin)    Mara Guillory, eldest son. For Serious or Chronic Illness:  Understanding of CPR, goals and expected outcomes, benefits and burdens discussed. Information on CPR success rates provided (e.g. for CPR in hospital, survival to d/c at two weeks is 22%, for chronically ill or elderly/frail survival is less than 3%); Individual asked to communicate understanding of information in his/her own words.     Interventions Provided:  Requested that patient provided document for review, agreed to notify son to provide       Jessy Funk NP    [facility] [place] [address] [facility]

## 2019-02-26 NOTE — PROGRESS NOTES
Progress Note 2/26/2019 10:15 AM 
NAME: Tori Delgado MRN:  [id number] Admit Diagnosis: CHF (congestive heart failure) ([facility] [I50.9] Assessment:  
 
 
Admitted with pneumonia -  Primary diagnosis. Mild pulmonary edema associated. Chronic Atrial Fibrillation . peripheral edema. Diabetes HTN Hyperlipidemia. Elevated TSH   ? Hypothyroid. Echo:  
· Estimated left ventricular ejection fraction is 51 - 55%. Left ventricular mild concentric hypertrophy. · Left atrial cavity size is moderately dilated. · Right atrial cavity size is severely dilated. · Mild aortic valve leaflet calcification present. Mild aortic valve stenosis is present. Mild aortic valve regurgitation is present. · Mitral valve thickening. Moderate to severe mitral valve regurgitation. · Moderate tricuspid valve regurgitation is present. 2/23   OOB in chair. Says she is feeling better today. Lungs still have significant diffuse wheezing. Mild edema. As noted , echo shows normal LV function. 2/25 Feeling better. Lungs are clear to auscultation. Ankles still have some edema OOB in chair , not on O2.  
 
2/26   HR and BP up today . Likely due to withdrawal of B Blocker. Resume Metoprolol . She had not been on anticoagulation as OP . Has been on ASA per med list.  I don't think a NOAC is going to be a good option for her. Risk of falls. Cost.  
 
 
  
 
 Plan:  
 
Continue current regimen. Resume Metoprolol D/c Diltiazem when HR is controlled. [x]        High complexity decision making was performed Subjective:  
 
Delores A Graceann Jeans denies chest pain, dyspnea. Discussed with RN events overnight. Patient Active Problem List  
Diagnosis Code  CHF (congestive heart failure) ([facility]) I50.9  Pneumonia J18.9 Review of Systems: 
 
Symptom Y/N Comments  Symptom Y/N Comments Fever/Chills N   Chest Pain N Poor Appetite N   Edema N   
 Cough N   Abdominal Pain N Sputum N   Joint Pain N   
SOB/STARR N   Pruritis/Rash N   
Nausea/vomit N   Tolerating PT/OT Y Diarrhea N   Tolerating Diet Y Constipation N   Other Could NOT obtain due to:   
 
Objective:  
  
Physical Exam: 
 
Last 24hrs VS reviewed since prior progress note. Most recent are: 
 
Visit Vitals BP (!) 138/110 Pulse (!) 140 Temp 97.3 °F (36.3 °C) Resp 16 Ht 5' 8\" (1.727 m) Wt 89.6 kg (197 lb 8.5 oz) SpO2 96% BMI 30.03 kg/m² Intake/Output Summary (Last 24 hours) at 2/26/2019 1055 Last data filed at 2/25/2019 1900 Gross per 24 hour Intake 438.13 ml Output  Net 438.13 ml General Appearance: Well developed, well nourished, alert & oriented x 3,  
 no acute distress. Ears/Nose/Mouth/Throat: Hearing grossly normal. 
Neck: Supple. Chest: Lungs clear to auscultation bilaterally. Cardiovascular: Regular rate and rhythm, S1S2 normal, no murmur. Abdomen: Soft, non-tender, bowel sounds are active. Extremities: No edema bilaterally. Skin: Warm and dry. PMH/SH reviewed - no change compared to H&P Data Review Telemetry: normal sinus rhythm Lab Data Personally Reviewed: 
 
Recent Labs  
  02/24/19 
0543 WBC 5.6 HGB 10.4* HCT 31.6*  
 LABRCNT(INR:3,PTP:3,APTT:3,) Recent Labs  
  02/25/19 
0317 02/24/19 
2155 02/24/19 
0543   --  139  
K 3.4* 2.9* 2.9*  
  --  104 CO2 28  --  25 BUN 11  --  10  
CREA 0.70  --  0.69 *  --  210* CA 8.0*  --  7.8* LABRCNT(CPK:3,CpKMB:3,ckndx:3,troiq:3)No results found for: CHOL, CHOLX, CHLST, CHOLV, HDL, LDL, LDLC, DLDLP, TGLX, TRIGL, TRIGP, CHHD, CHHDXLABRCNT(sgot:3,gpt:3,ap:3,tbiL:3,TP:3,ALB:3,GLOB:3,ggt:3,aml:3,amyp:3,lpse:3,hlpse:3)No results for input(s): PH, PCO2, PO2 in the last 72 hours.  
No results found for: CHOL, CHOLX, CHLST, CHOLV, HDL, LDL, LDLC, DLDLP, TGLX, TRIGL, TRIGP, CHHD, CHHDXMEDTABLETimmelissa Mcgowan MD 
 No results for input(s): PH, PCO2, PO2 in the last 72 hours. Medications Personally Reviewed: 
 
Current Facility-Administered Medications Medication Dose Route Frequency  metoprolol succinate (TOPROL-XL) XL tablet 50 mg  50 mg Oral DAILY  glucose chewable tablet 16 g  4 Tab Oral PRN  
 enoxaparin (LOVENOX) injection 90 mg  1 mg/kg SubCUTAneous Q12H  
 albuterol-ipratropium (DUO-NEB) 2.5 MG-0.5 MG/3 ML  3 mL Nebulization TID RT  
 guaiFENesin ER (MUCINEX) tablet 600 mg  600 mg Oral BID  
 azithromycin (ZITHROMAX) 500 mg in 0.9% sodium chloride (MBP/ADV) 250 mL  500 mg IntraVENous Q24H  
 glucose chewable tablet 16 g  4 Tab Oral PRN  
 dextrose (D50W) injection syrg 12.5-25 g  25-50 mL IntraVENous PRN  
 glucagon (GLUCAGEN) injection 1 mg  1 mg IntraMUSCular PRN  
 insulin lispro (HUMALOG) injection   SubCUTAneous AC&HS  atorvastatin (LIPITOR) tablet 40 mg  40 mg Oral EVERY OTHER DAY  losartan (COZAAR) tablet 100 mg  100 mg Oral DAILY  guanFACINE IR (TENEX) tablet 2 mg  2 mg Oral QHS  
 HYDROcodone-acetaminophen (NORCO) 5-325 mg per tablet 0.5 Tab  0.5 Tab Oral Q6H PRN  
 aspirin delayed-release tablet 81 mg  81 mg Oral DAILY  pantoprazole (PROTONIX) tablet 40 mg  40 mg Oral ACB  levothyroxine (SYNTHROID) tablet 125 mcg  125 mcg Oral ACB  sodium chloride (NS) flush 5-40 mL  5-40 mL IntraVENous Q8H  
 sodium chloride (NS) flush 5-40 mL  5-40 mL IntraVENous PRN  
 acetaminophen (TYLENOL) tablet 650 mg  650 mg Oral Q6H PRN  
 ondansetron (ZOFRAN) injection 4 mg  4 mg IntraVENous Q8H PRN  
 bisacodyl (DULCOLAX) tablet 5 mg  5 mg Oral DAILY PRN  
 cefTRIAXone (ROCEPHIN) 1 g in 0.9% sodium chloride (MBP/ADV) 50 mL  1 g IntraVENous Q24H  
 dilTIAZem (CARDIZEM) 125 mg in dextrose 5% 125 mL infusion  0-15 mg/hr IntraVENous TITRATE Opal Estrella MD

## 2019-02-26 NOTE — PROGRESS NOTES
*CM acknowledged consult* CM will provide pt with Eliquis card that will assist with the pricing of medication coverage. Pt has been accepted to [facility] to assist with her at home needs. CM will continue to follow. SARAH Lisa, [address]

## 2019-02-26 NOTE — CARDIO/PULMONARY
Cardiopulmonary Rehab Nursing Entry: 
 
Pt is on CHF Bundle List 
 
Cardiac Rehab: Living with Heart Failure Booklet given to Yemi. Educated using teach back method. Discussed diagnosis definition and assessed patient understanding. Reviewed importance of daily weight monitoring and Low Sodium diet (9582-7090 mg. daily). Encouraged activity and rest periods within symptom limitations and as ordered by physician. Reviewed the purpose of medication, potential side effects, compliance, and what to do if dose is missed. Discussed importance of reporting signs and symptoms of exacerbation, and when to report them to the doctor, to prevent re-hospitalization. Yemi was encouraged to keep all appointments with doctor. Dinoar Pringle reported that her son helps take care of her at home. She eats frozen vegetables and avoids added table salt. She has a system for medication compliance and states that she is able to do for herself. She is ambulatory in the home with the assist of a walker. She has been provided with a scale this admission and instruction on daily weight assessment.

## 2019-02-26 NOTE — PROGRESS NOTES
Attempted to see pt this am and per nursing will defer tx today 2/2 to high HR ( 130's at rest). Will continue to follow and tx when pt is stable and when cleared by nursing.

## 2019-02-26 NOTE — PROGRESS NOTES
ADULT PROTOCOL: JET AEROSOL  REASSESSMENT Patient  Daniela Peralta     77 y.o.   female     2/26/2019  11:31 AM 
 
Breath Sounds Pre Procedure: Right Breath Sounds: Diminished Left Breath Sounds: Diminished Breath Sounds Post Procedure: Right Breath Sounds: Diminished Left Breath Sounds: Diminished Breathing pattern: Pre procedure Breathing Pattern: Regular Post procedure Breathing Pattern: Regular Heart Rate: Pre procedure Pulse: 95 
         Post procedure Pulse: 129 Resp Rate: Pre procedure Respirations: 18 Post procedure Respirations: 18 
 
Cough: Pre procedure Cough: Non-productive Post procedure Cough: Non-productive Oxygen: O2 Device: Room air Changed: NO SpO2: Pre procedure SpO2: 97 %   without oxygen Post procedure SpO2: 96 %  without oxygen Nebulizer Therapy: Current medications Aerosolized Medications: DuoNeb Changed: YES Q6 PRN Problem List:  
Patient Active Problem List  
Diagnosis Code  CHF (congestive heart failure) ([facility]) I50.9  Pneumonia J18.9 Respiratory Therapist: Delisa Horta RT

## 2019-02-26 NOTE — PROGRESS NOTES
Hospitalist Progress Note NAME: Vivian Foreman :  1940 MRN:  [id number] Assessment / Plan: 
. AF with RVR Hear rate is still not well controlled. Started him on cardizem gtt, metoprolol and lovenox. Heart rate still high may need to add digoxin. Cardiology on board, appreciate input. .   Community Acquired Pneumonia,  
Cont IV abx with azithro/ceftriaxone Follow-up sputum culture Improving slowly, still some wheezing [place] DM Cont SSI w POCs Hold home metformin and glimeperide [place] Hypertension Continue home  Cozaar 100 mg daily [place] Hypothyroidism Continue Synthyroid 125 mcg daily, elevated TSH noted FT4 nL, repeat TSH outpatient once acute medical issues addressed Fr[place]oso Golder Hyperlipidemia Continue simvastatin 80 mg daily 
  
 
Code Status: Full Surrogate Decision Maker: 
Abdullahi Fernández  
  
  
DVT Prophylaxis: Lovenox GI Prophylaxis: not indicated 
  
Baseline: independent Subjective: Chief Complaint / Reason for Physician Visit Sitting in bedside chair. sats ok on RA at rest. Pt tachy with movement Discussed with RN events overnight. Objective: VITALS:  
Last 24hrs VS reviewed since prior progress note. Most recent are: 
Patient Vitals for the past 24 hrs: 
 Temp Pulse Resp BP SpO2  
19 1158  (!) 116     
19 1153  (!) 103     
19 1148  (!) 116  (!) 132/94   
19 1117 98 °F (36.7 °C) (!) 109 16 110/80   
19 1113 98 °F (36.7 °C) (!) 131 16 110/80 96 % 19 1009  (!) 140     
19 0954  (!) 136  (!) 138/110   
19 0953  (!) 160     
19 0951  (!) 176     
19 0943  (!) 157     
19 0933  (!) 134     
19 0929  (!) 168     
19 0919  (!) 125     
19 0902  (!) 127     
19 0848  (!) 109     
19 0846  (!) 106     
19 0843  (!) 133     
19 0841     96 % 19 0819  (!) 109    02/26/19 0815  (!) 146     
02/26/19 0811  (!) 107     
02/26/19 0806  (!) 151     
02/26/19 0801  (!) 124     
02/26/19 0751  (!) 128     
02/26/19 0750  (!) 129     
02/26/19 0748  (!) 161     
02/26/19 0712 97.3 °F (36.3 °C) 98 16 139/62 96 % 02/26/19 0403 98.1 °F (36.7 °C) 93 16 109/77   
02/25/19 2345 98 °F (36.7 °C) 87 16 134/67 96 % 02/25/19 2138  95  (!) 131/91   
02/25/19 2040 97.7 °F (36.5 °C) 90 16 113/61 98 % 02/25/19 2039     97 % 02/25/19 1535  98   98 % 02/25/19 1534 97.8 °F (36.6 °C) (!) 105 18 138/70 98 % Intake/Output Summary (Last 24 hours) at 2/26/2019 1235 Last data filed at 2/25/2019 1900 Gross per 24 hour Intake 258.13 ml Output  Net 258.13 ml PHYSICAL EXAM: 
General: WD, WN. Alert, cooperative, no acute distress   
EENT:  EOMI. Anicteric sclerae. MMM Resp:  CTA bilaterally, no wheezing or rales. No accessory muscle use CV:  IRIR,  No edema GI:  Soft, Non distended, Non tender.  +Bowel sounds Neurologic:  Alert and oriented X 3, normal speech, Psych:   Good insight. Not anxious nor agitated Skin:  No rashes. No jaundice Reviewed most current lab test results and cultures  YES Reviewed most current radiology test results   YES Review and summation of old records today    NO Reviewed patient's current orders and MAR    YES 
PMH/SH reviewed - no change compared to H&P 
________________________________________________________________________ Care Plan discussed with: 
  Comments Patient x Family RN x Care Manager Consultant     
                    
________________________________________________________________________ Comments >50% of visit spent in counseling and coordination of care x   
________________________________________________________________________ Jayshree Christensen MD  
 
Procedures: see electronic medical records for all procedures/Xrays and details which were not copied into this note but were reviewed prior to creation of Plan. LABS: 
I reviewed today's most current labs and imaging studies. Pertinent labs include: 
Recent Labs  
  02/24/19 
0543 WBC 5.6 HGB 10.4* HCT 31.6*  
 Recent Labs  
  02/25/19 
0317 02/24/19 2155 02/24/19 
0543   --  139  
K 3.4* 2.9* 2.9*  
  --  104 CO2 28  --  25 *  --  210* BUN 11  --  10  
CREA 0.70  --  0.69 CA 8.0*  --  7.8* ALB  --   --  3.0* TBILI  --   --  0.9 SGOT  --   --  27 ALT  --   --  21 Signed: Marty Alexandra MD

## 2019-02-27 LAB
ANION GAP SERPL CALC-SCNC: 8 MMOL/L (ref 5–15)
BACTERIA SPEC CULT: ABNORMAL
BACTERIA SPEC CULT: ABNORMAL
BUN SERPL-MCNC: 14 MG/DL (ref 6–20)
BUN/CREAT SERPL: 17 (ref 12–20)
CALCIUM SERPL-MCNC: 9.3 MG/DL (ref 8.5–10.1)
CHLORIDE SERPL-SCNC: 102 MMOL/L (ref 97–108)
CO2 SERPL-SCNC: 28 MMOL/L (ref 21–32)
CREAT SERPL-MCNC: 0.83 MG/DL (ref 0.55–1.02)
GLUCOSE BLD STRIP.AUTO-MCNC: 142 MG/DL (ref 65–100)
GLUCOSE BLD STRIP.AUTO-MCNC: 147 MG/DL (ref 65–100)
GLUCOSE BLD STRIP.AUTO-MCNC: 149 MG/DL (ref 65–100)
GLUCOSE BLD STRIP.AUTO-MCNC: 167 MG/DL (ref 65–100)
GLUCOSE SERPL-MCNC: 164 MG/DL (ref 65–100)
MAGNESIUM SERPL-MCNC: 2.2 MG/DL (ref 1.6–2.4)
POTASSIUM SERPL-SCNC: 3.7 MMOL/L (ref 3.5–5.1)
SERVICE CMNT-IMP: ABNORMAL
SODIUM SERPL-SCNC: 138 MMOL/L (ref 136–145)

## 2019-02-27 PROCEDURE — 65660000000 HC RM CCU STEPDOWN

## 2019-02-27 PROCEDURE — 80048 BASIC METABOLIC PNL TOTAL CA: CPT

## 2019-02-27 PROCEDURE — 74011000258 HC RX REV CODE- 258: Performed by: INTERNAL MEDICINE

## 2019-02-27 PROCEDURE — 74011250637 HC RX REV CODE- 250/637: Performed by: INTERNAL MEDICINE

## 2019-02-27 PROCEDURE — 82962 GLUCOSE BLOOD TEST: CPT

## 2019-02-27 PROCEDURE — 74011250636 HC RX REV CODE- 250/636: Performed by: EMERGENCY MEDICINE

## 2019-02-27 PROCEDURE — 74011250636 HC RX REV CODE- 250/636: Performed by: INTERNAL MEDICINE

## 2019-02-27 PROCEDURE — 97116 GAIT TRAINING THERAPY: CPT | Performed by: PHYSICAL THERAPIST

## 2019-02-27 PROCEDURE — 97530 THERAPEUTIC ACTIVITIES: CPT | Performed by: PHYSICAL THERAPIST

## 2019-02-27 PROCEDURE — 36415 COLL VENOUS BLD VENIPUNCTURE: CPT

## 2019-02-27 PROCEDURE — 74011636637 HC RX REV CODE- 636/637: Performed by: INTERNAL MEDICINE

## 2019-02-27 PROCEDURE — 94640 AIRWAY INHALATION TREATMENT: CPT

## 2019-02-27 PROCEDURE — 83735 ASSAY OF MAGNESIUM: CPT

## 2019-02-27 PROCEDURE — 74011250637 HC RX REV CODE- 250/637: Performed by: HOSPITALIST

## 2019-02-27 PROCEDURE — 74011000250 HC RX REV CODE- 250: Performed by: NURSE PRACTITIONER

## 2019-02-27 RX ORDER — METOPROLOL TARTRATE 50 MG/1
100 TABLET ORAL 2 TIMES DAILY
Status: DISCONTINUED | OUTPATIENT
Start: 2019-02-27 | End: 2019-03-02 | Stop reason: HOSPADM

## 2019-02-27 RX ORDER — IPRATROPIUM BROMIDE AND ALBUTEROL SULFATE 2.5; .5 MG/3ML; MG/3ML
3 SOLUTION RESPIRATORY (INHALATION)
Status: DISCONTINUED | OUTPATIENT
Start: 2019-02-27 | End: 2019-03-01

## 2019-02-27 RX ADMIN — GUAIFENESIN 600 MG: 600 TABLET, EXTENDED RELEASE ORAL at 09:21

## 2019-02-27 RX ADMIN — METOPROLOL SUCCINATE 50 MG: 50 TABLET, EXTENDED RELEASE ORAL at 09:20

## 2019-02-27 RX ADMIN — ENOXAPARIN SODIUM 90 MG: 100 INJECTION, SOLUTION INTRAVENOUS; SUBCUTANEOUS at 09:19

## 2019-02-27 RX ADMIN — ATORVASTATIN CALCIUM 40 MG: 10 TABLET, FILM COATED ORAL at 21:47

## 2019-02-27 RX ADMIN — Medication 10 ML: at 17:20

## 2019-02-27 RX ADMIN — METOPROLOL TARTRATE 100 MG: 50 TABLET ORAL at 17:19

## 2019-02-27 RX ADMIN — IPRATROPIUM BROMIDE AND ALBUTEROL SULFATE 3 ML: .5; 3 SOLUTION RESPIRATORY (INHALATION) at 13:13

## 2019-02-27 RX ADMIN — Medication 10 ML: at 05:34

## 2019-02-27 RX ADMIN — LOSARTAN POTASSIUM 100 MG: 100 TABLET, FILM COATED ORAL at 09:20

## 2019-02-27 RX ADMIN — GUANFACINE HYDROCHLORIDE 2 MG: 1 TABLET ORAL at 21:47

## 2019-02-27 RX ADMIN — CEFTRIAXONE SODIUM 1 G: 1 INJECTION, POWDER, FOR SOLUTION INTRAMUSCULAR; INTRAVENOUS at 05:34

## 2019-02-27 RX ADMIN — AZITHROMYCIN MONOHYDRATE 500 MG: 500 INJECTION, POWDER, LYOPHILIZED, FOR SOLUTION INTRAVENOUS at 03:54

## 2019-02-27 RX ADMIN — ASPIRIN 81 MG: 81 TABLET, COATED ORAL at 09:21

## 2019-02-27 RX ADMIN — IPRATROPIUM BROMIDE AND ALBUTEROL SULFATE 3 ML: .5; 3 SOLUTION RESPIRATORY (INHALATION) at 20:05

## 2019-02-27 RX ADMIN — GUAIFENESIN 600 MG: 600 TABLET, EXTENDED RELEASE ORAL at 17:20

## 2019-02-27 RX ADMIN — POLYETHYLENE GLYCOL 3350 17 G: 17 POWDER, FOR SOLUTION ORAL at 09:20

## 2019-02-27 RX ADMIN — Medication 10 ML: at 21:47

## 2019-02-27 RX ADMIN — PANTOPRAZOLE SODIUM 40 MG: 40 TABLET, DELAYED RELEASE ORAL at 09:20

## 2019-02-27 RX ADMIN — INSULIN LISPRO 2 UNITS: 100 INJECTION, SOLUTION INTRAVENOUS; SUBCUTANEOUS at 09:21

## 2019-02-27 RX ADMIN — INSULIN LISPRO 2 UNITS: 100 INJECTION, SOLUTION INTRAVENOUS; SUBCUTANEOUS at 13:08

## 2019-02-27 RX ADMIN — LEVOTHYROXINE SODIUM 125 MCG: 125 TABLET ORAL at 09:20

## 2019-02-27 RX ADMIN — FUROSEMIDE 40 MG: 40 TABLET ORAL at 09:20

## 2019-02-27 RX ADMIN — INSULIN LISPRO 2 UNITS: 100 INJECTION, SOLUTION INTRAVENOUS; SUBCUTANEOUS at 17:20

## 2019-02-27 RX ADMIN — ENOXAPARIN SODIUM 90 MG: 100 INJECTION, SOLUTION INTRAVENOUS; SUBCUTANEOUS at 21:46

## 2019-02-27 NOTE — PROGRESS NOTES
Hospitalist Progress Note NAME: Daniel Yuen :  1940 MRN:  [id number] Assessment / Plan: 
. AF with RVR Hear rate is still not well controlled. Initially treated with cardizem gtt. Appreciate cardiology input. Recommended to stop cardizem gtt. Cont. metoprolol and lovenox. Heart rate still high may need to add digoxin or PO cardizem. .   Community Acquired Pneumonia,  
Cont IV abx with azithro/ceftriaxone. Follow up sputum culture. Improving slowly, still some wheezing [place] DM Cont SSI w POCs. Hold home metformin and glimeperide [place] Hypertension Continue home  Cozaar 100 mg daily [place] Hypothyroidism Continue Synthyroid 125 mcg daily, elevated TSH noted. FT4 nL, repeat TSH outpatient once acute medical issues addressed [place] Hyperlipidemia Continue simvastatin 80 mg daily 
  
 
Code Status: Full Surrogate Decision Maker: 
Abdullahi Fernández  
  
DVT Prophylaxis: Lovenox GI Prophylaxis: not indicated 
  
Baseline: independent Subjective: Chief Complaint / Reason for Physician Visit Sitting in bedside chair. sats ok on RA at rest. Pt tachy with movement Discussed with RN events overnight. Objective: VITALS:  
Last 24hrs VS reviewed since prior progress note. Most recent are: 
Patient Vitals for the past 24 hrs: 
 Temp Pulse Resp BP SpO2  
19 1225  (!) 122  143/88   
19 1152 97.8 °F (36.6 °C) (!) 121 16 (!) 141/108 96 % 19 0756 98 °F (36.7 °C) 89 16 (!) 150/91   
19 0355 98.2 °F (36.8 °C) 88 16 170/74   
19 0007 97.8 °F (36.6 °C) 92 16 134/80 97 % 19 2137  80  147/72   
19 2015 98 °F (36.7 °C) 93 16 135/66 98 % 19 1701 97.6 °F (36.4 °C) 92 16 138/77 96 % 19 1356  92     
19 1353  (!) 104     
19 1337  77    Intake/Output Summary (Last 24 hours) at 2019 1301 Last data filed at 2019 6800 Gross per 24 hour Intake 449.83 ml Output   
 Net 449.83 ml PHYSICAL EXAM: 
General: WD, WN. Alert, cooperative, no acute distress   
EENT:  EOMI. Anicteric sclerae. MMM Resp:  Coarse breath sound. No accessory muscle use CV:  IRIR,  No edema, Tachycardia. GI:  Soft, Non distended, Non tender.  +Bowel sounds Neurologic:  Alert and oriented X 3, normal speech, Psych:   Good insight. Not anxious nor agitated Skin:  No rashes. No jaundice Reviewed most current lab test results and cultures  YES Reviewed most current radiology test results   YES Review and summation of old records today    NO Reviewed patient's current orders and MAR    YES 
PMH/ reviewed - no change compared to H&P 
________________________________________________________________________ Care Plan discussed with: 
  Comments Patient x Family RN x Care Manager Consultant     
                    
________________________________________________________________________ Comments >50% of visit spent in counseling and coordination of care x   
________________________________________________________________________ Tl Lima MD  
 
Procedures: see electronic medical records for all procedures/Xrays and details which were not copied into this note but were reviewed prior to creation of Plan. LABS: 
I reviewed today's most current labs and imaging studies. Pertinent labs include: No results for input(s): WBC, HGB, HCT, PLT, HGBEXT, HCTEXT, PLTEXT, HGBEXT, HCTEXT, PLTEXT in the last 72 hours. Recent Labs  
  02/27/19 
0358 02/25/19 0317 02/24/19 
2155  139  --   
K 3.7 3.4* 2.9*  
 104  --   
CO2 28 28  --   
* 158*  --   
BUN 14 11  --   
CREA 0.83 0.70  --   
CA 9.3 8.0*  --   
MG 2.2  --   --   
 
 
Signed: Tl Lima MD

## 2019-02-27 NOTE — PROGRESS NOTES
PCU SHIFT NURSING NOTE Bedside and Verbal shift change report given to Ramsey Watson RN (oncoming nurse) by Lamont Ramon RN (offgoing nurse). Report included the following information SBAR, Kardex, MAR and Recent Results. Shift Summary:  
0720: Bedside and Verbal shift change report given to Lamont Ramon RN (oncoming nurse) by Ramsey Watson RN (offgoing nurse). Report included the following information SBAR, Kardex, MAR and Recent Results. Admission Date 2/22/2019 Admission Diagnosis CHF (congestive heart failure) ([facility] [address] [I50.9] Consults IP CONSULT TO CARDIOLOGY Consults []PT []OT []Speech  
[]Case Management  
  
[] Palliative Cardiac Monitoring Order []Yes []No  
 
IV drips []Yes Drip:                            Dose: 
Drip:                            Dose: 
Drip:                            Dose:  
[]No  
 
GI Prophylaxis []Yes []No  
 
 
 
DVT Prophylaxis SCDs:  Sequential Compression Device: Bilateral  
  Patient Refused VTE Prophylaxis: Yes(at days) Juan stockings:     
  
[] Medication []Contraindicated []None Activity Level Activity Level: Up with Assistance Activity Assistance: Partial (one person) Purposeful Rounding every 1-2 hour? []Yes Turner Score  Total Score: 3 Bed Alarm (If score 3 or >) []Yes  
[] Refused (See signed refusal form in chart) Fabricio Score  Fabricio Score: 18 Fabricio Score (if score 14 or less) []PMT consult  
[]Wound Care consult []Specialty bed  
[] Nutrition consult Needs prior to discharge:  
Home O2 required:   
[]Yes []No  
 If yes, how much O2 required? Other:  
 Last Bowel Movement: Last Bowel Movement Date: 02/24/19 Influenza Vaccine Received Flu Vaccine for Current Season (usually Sept-March): Yes Pneumonia Vaccine Diet Active Orders Diet DIET DIABETIC CONSISTENT CARB Regular LDAs Peripheral IV 02/22/19 Right;Posterior Wrist (Active) Site Assessment Clean, dry, & intact 2/26/2019  4:25 PM  
Phlebitis Assessment 0 2/26/2019  4:25 PM  
Infiltration Assessment 0 2/26/2019  4:25 PM  
Dressing Status Clean, dry, & intact 2/26/2019  4:25 PM  
Dressing Type Transparent;Tape 2/26/2019  4:25 PM  
Hub Color/Line Status Pink;Capped 2/26/2019  4:25 PM  
Action Taken Open ports on tubing capped 2/26/2019  4:25 PM  
Alcohol Cap Used Yes 2/26/2019  4:25 PM  
   
Peripheral IV 02/24/19 Right Hand (Active) Site Assessment Clean, dry, & intact 2/26/2019  4:25 PM  
Phlebitis Assessment 0 2/26/2019  4:25 PM  
Infiltration Assessment 0 2/26/2019  4:25 PM  
Dressing Status Clean, dry, & intact 2/26/2019  4:25 PM  
Dressing Type Transparent 2/26/2019  4:25 PM  
Hub Color/Line Status Pink 2/26/2019  4:25 PM  
Action Taken Open ports on tubing capped 2/26/2019  4:25 PM  
Alcohol Cap Used Yes 2/26/2019  4:25 PM  
                  
Urinary Catheter Intake & Output Date 02/25/19 1900 - 02/26/19 6901 02/26/19 0700 - 02/27/19 1519 Shift 7400-9559 24 Hour Total 4630-7602 9386-9352 24 Hour Total  
INTAKE  
[address] 620 480  480  
  [address] 620 480  480  
I. V.(mL/kg/hr) 58.1 58.1 129.8(0.1)  129.8 Cardizem Volume 58.1 58.1 129.8  129.8 Shift Total(mL/kg) 158.1(1.8) 678.1(7.6) 609.8(6.8)  609.8(6.8) OUTPUT Urine(mL/kg/hr) Urine Occurrence(s) 6 x 9 x 3 x  3 x Shift Total(mL/kg) .1 678.1 609.8  609.8 Weight (kg) 89.6 89.6 89.6 89.6 89.6 Readmission Risk Assessment Tool Score Low Risk 12 Total Score 3 Has Seen PCP in Last 6 Months (Yes=3, No=0)  
 5 Pt. Coverage (Medicare=5 , Medicaid, or Self-Pay=4) 4 Charlson Comorbidity Score (Age + Comorbid Conditions) Criteria that do not apply:  
 . Living with Significant Other. Assisted Living. LTAC. SNF. or  
Rehab Patient Length of Stay (>5 days = 3) IP Visits Last 12 Months (1-3=4, 4=9, >4=11) Expected Length of Stay 4d 4h  
 Actual Length of Stay 4

## 2019-02-27 NOTE — PROGRESS NOTES
Progress Note 2/27/2019 10:15 AM 
NAME: Ronald Haas MRN:  [id number] Admit Diagnosis: CHF (congestive heart failure) ([facility].) [I50.9] Assessment:  
 
 
Admitted with pneumonia -  Primary diagnosis. Mild pulmonary edema associated. Chronic Atrial Fibrillation . peripheral edema. Diabetes HTN Hyperlipidemia. Elevated TSH   ? Hypothyroid. Echo:  
· Estimated left ventricular ejection fraction is 51 - 55%. Left ventricular mild concentric hypertrophy. · Left atrial cavity size is moderately dilated. · Right atrial cavity size is severely dilated. · Mild aortic valve leaflet calcification present. Mild aortic valve stenosis is present. Mild aortic valve regurgitation is present. · Mitral valve thickening. Moderate to severe mitral valve regurgitation. · Moderate tricuspid valve regurgitation is present. 2/23   OOB in chair. Says she is feeling better today. Lungs still have significant diffuse wheezing. Mild edema. As noted , echo shows normal LV function. 2/25 Feeling better. Lungs are clear to auscultation. Ankles still have some edema OOB in chair , not on O2.  
 
2/26   HR and BP up today . Likely due to withdrawal of B Blocker. Resume Metoprolol . She had not been on anticoagulation as OP . Has been on ASA per med list.  I don't think a NOAC is going to be a good option for her. Risk of falls. Cost.  
 
2/27  Hemodynamics stable. Pulse is better. Will need to titrate her meds. She was on 150 mg/ day on Metoprolol,  Clonidine and Amlodipine PTA. Say may need further med for BP control as she moves along. Plan:  
 
Continue current regimen. Resume Metoprolol D/c Diltiazem today 2/27 [x]        High complexity decision making was performed Subjective:  
 
Dinora Yost denies chest pain, dyspnea. Discussed with RN events overnight. Patient Active Problem List  
Diagnosis Code  CHF (congestive heart failure) ([facility]) I50.9  Pneumonia J18.9 Review of Systems: 
 
Symptom Y/N Comments  Symptom Y/N Comments Fever/Chills N   Chest Pain N Poor Appetite N   Edema N   
Cough N   Abdominal Pain N Sputum N   Joint Pain N   
SOB/STARR N   Pruritis/Rash N   
Nausea/vomit N   Tolerating PT/OT Y Diarrhea N   Tolerating Diet Y Constipation N   Other Could NOT obtain due to:   
 
Objective:  
  
Physical Exam: 
 
Last 24hrs VS reviewed since prior progress note. Most recent are: 
 
Visit Vitals /74 Pulse 88 Temp 98.2 °F (36.8 °C) Resp 16 Ht 5' 8\" (1.727 m) Wt 90 kg (198 lb 6.6 oz) SpO2 97% BMI 30.17 kg/m² Intake/Output Summary (Last 24 hours) at 2/27/2019 1041 Last data filed at 2/27/2019 8701 Gross per 24 hour Intake 549.83 ml Output  Net 549.83 ml General Appearance: Well developed, well nourished, alert & oriented x 3,  
 no acute distress. Ears/Nose/Mouth/Throat: Hearing grossly normal. 
Neck: Supple. Chest: Lungs clear to auscultation bilaterally. Cardiovascular: Regular rate and rhythm, S1S2 normal, no murmur. Abdomen: Soft, non-tender, bowel sounds are active. Extremities: No edema bilaterally. Skin: Warm and dry. PMH/SH reviewed - no change compared to H&P Data Review Telemetry: normal sinus rhythm Lab Data Personally Reviewed: 
 
No results for input(s): WBC, HGB, HCT, PLT, HGBEXT, HCTEXT, PLTEXT, HGBEXT, HCTEXT, PLTEXT in the last 72 hours. LABRCNT(INR:3,PTP:3,APTT:3,) Recent Labs  
  02/27/19 
0358 02/25/19 
0317 02/24/19 
2155  139  --   
K 3.7 3.4* 2.9*  
 104  --   
CO2 28 28  --   
BUN 14 11  --   
CREA 0.83 0.70  --   
* 158*  --   
CA 9.3 8.0*  --   
MG 2.2  --   --   
LABRCNT(CPK:3,CpKMB:3,ckndx:3,troiq:3)No results found for: CHOL, CHOLX, CHLST, CHOLV, HDL, LDL, LDLC, DLDLP, TGLX, TRIGL, TRIGP, CHHD, CHHDXLABRCNT(sgot:3,gpt:3,ap:3,tbiL:3,TP:3,ALB:3,GLOB:3,ggt:3,aml:3,amyp:3, lpse:3,hlpse:3)No results for input(s): PH, PCO2, PO2 in the last 72 hours. No results found for: CHOL, CHOLX, CHLST, CHOLV, HDL, LDL, LDLC, DLDLP, TGLX, TRIGL, TRIGP, CHHD, CHHDXMEDTABLETimmelissa Mcgrath MD 
No results for input(s): PH, PCO2, PO2 in the last 72 hours. Medications Personally Reviewed: 
 
Current Facility-Administered Medications Medication Dose Route Frequency  metoprolol succinate (TOPROL-XL) XL tablet 50 mg  50 mg Oral DAILY  furosemide (LASIX) tablet 40 mg  40 mg Oral DAILY  albuterol-ipratropium (DUO-NEB) 2.5 MG-0.5 MG/3 ML  3 mL Nebulization Q6H PRN  
 docusate sodium (COLACE) capsule 100 mg  100 mg Oral BID PRN  polyethylene glycol (MIRALAX) packet 17 g  17 g Oral DAILY  glucose chewable tablet 16 g  4 Tab Oral PRN  
 enoxaparin (LOVENOX) injection 90 mg  1 mg/kg SubCUTAneous Q12H  
 guaiFENesin ER (MUCINEX) tablet 600 mg  600 mg Oral BID  
 azithromycin (ZITHROMAX) 500 mg in 0.9% sodium chloride (MBP/ADV) 250 mL  500 mg IntraVENous Q24H  
 glucose chewable tablet 16 g  4 Tab Oral PRN  
 dextrose (D50W) injection syrg 12.5-25 g  25-50 mL IntraVENous PRN  
 glucagon (GLUCAGEN) injection 1 mg  1 mg IntraMUSCular PRN  
 insulin lispro (HUMALOG) injection   SubCUTAneous AC&HS  atorvastatin (LIPITOR) tablet 40 mg  40 mg Oral EVERY OTHER DAY  losartan (COZAAR) tablet 100 mg  100 mg Oral DAILY  guanFACINE IR (TENEX) tablet 2 mg  2 mg Oral QHS  
 HYDROcodone-acetaminophen (NORCO) 5-325 mg per tablet 0.5 Tab  0.5 Tab Oral Q6H PRN  
 aspirin delayed-release tablet 81 mg  81 mg Oral DAILY  pantoprazole (PROTONIX) tablet 40 mg  40 mg Oral ACB  levothyroxine (SYNTHROID) tablet 125 mcg  125 mcg Oral ACB  sodium chloride (NS) flush 5-40 mL  5-40 mL IntraVENous Q8H  
 sodium chloride (NS) flush 5-40 mL  5-40 mL IntraVENous PRN  
 acetaminophen (TYLENOL) tablet 650 mg  650 mg Oral Q6H PRN  
 ondansetron (ZOFRAN) injection 4 mg  4 mg IntraVENous Q8H PRN  
  bisacodyl (DULCOLAX) tablet 5 mg  5 mg Oral DAILY PRN  
 cefTRIAXone (ROCEPHIN) 1 g in 0.9% sodium chloride (MBP/ADV) 50 mL  1 g IntraVENous Q24H  
 dilTIAZem (CARDIZEM) 125 mg in dextrose 5% 125 mL infusion  0-15 mg/hr IntraVENous TITRATE Joe Parra MD

## 2019-02-27 NOTE — PROGRESS NOTES
Dr Natalya Kirkland paged as pt needs better rate control lopressor alone is not holding her and we would like to keep her off the drip to get her ready for discharge. At rest she is fine with activity goes up into 130' ( see flow sheet).

## 2019-02-27 NOTE — PROGRESS NOTES
Physical Therapy Goals Initiated 2/22/2019 1. Patient will move from supine to sit and sit to supine , scoot up and down and roll side to side in bed with independence within 7 day(s). 2.  Patient will transfer from bed to chair and chair to bed with independence using the least restrictive device within 7 day(s). 3.  Patient will perform sit to stand with independence within 7 day(s). 4.  Patient will ambulate with modified independence for 150 feet with the least restrictive device within 7 day(s). 5.  Patient will ascend/descend 5 stairs with B handrail(s) with minimal assistance/contact guard assist within 7 day(s). 
 
 
  
PHYSICAL THERAPY TREATMENT Patient: Elina Butts (1 y.o. male) Date: 2/27/2019 Diagnosis: Pneumonia [J18.9] Acute respiratory failure with hypoxia (HCC) [J96.01] <principal problem not specified> 
   
  
Chart, physical therapy assessment, plan of care and goals were reviewed. 
  
ASSESSMENT: Patient continues to demonstrate impaired activity tolerance with increased HR but mildly improved today. Resting XP=722 and HR increasing and fluctuating from 123-143 and decreases immediately with rest. Patient requiring CGA to SBA for transfers and ambulation. Gait is slow but steady overall using RW for support. Able to ambulate 30 feet today requiring one brief standing rest secondary to elevated HR. Patient reporting difficulty with ambulation using cane in the community. Patient likely benefit from use of rollator for ambulation outside to reduce risk of falls. 
  
Progression toward goals: 
[]    Improving appropriately and progressing toward goals [x]    Improving slowly and progressing toward goals 
[]    Not making progress toward goals and plan of care will be adjusted  
  PLAN: 
Patient continues to benefit from skilled intervention to address the above impairments. Continue treatment per established plan of care. Discharge Recommendations:  Home Health Further Equipment Recommendations for Discharge: May benefit from rollator  
  SUBJECTIVE:  
Patient stated I try to use my cane but I really have trouble with it. I feel very unsafe.  
  
OBJECTIVE DATA SUMMARY:  
Critical Behavior: 
Neurologic State: (P) Alert, Appropriate for age Orientation Level: (P) Oriented X4 Cognition: (P) Appropriate decision making Safety/Judgement: Awareness of environment, Fall prevention, Home safety Functional Mobility Training: 
Bed Mobility: 
 deferred; patient sitting on couch upon arrival 
Transfers: 
Sit to Stand: Contact guard assistance Stand to Sit: Contact guard assistance Balance: 
Sitting: Intact Standing: Impaired Standing - Static: Good;Constant support Standing - Dynamic : Good(using RW for support) Ambulation/Gait Training: 
Distance (ft): 30 Feet (ft)(in room with brief standing rest after 15 feet) Ambulation - Level of Assistance: Stand-by assistance Gait Abnormalities: Decreased step clearance(increased forward flexion over RW) Base of Support: Widened Speed/Liv: Pace decreased (<100 feet/min); Slow Step Length: Left shortened;Right shortened Gait is slow but no overt LOB noted 
  
Pain: 
Pain Scale 1: Numeric (0 - 10) Pain Intensity 1: 0 Activity Tolerance:  
Resting KK=644 and fluctuating from 123-143 during activity Please refer to the flowsheet for vital signs taken during this treatment. After treatment:  
[x]    Patient left in no apparent distress sitting up in chair 
[]    Patient left in no apparent distress in bed 
[x]    Call bell left within reach [x]    Nursing notified 
[]    Caregiver present 
[]    Bed alarm activated 
  
COMMUNICATION/COLLABORATION:  
The patients plan of care was discussed with: Registered Nurse 
  
Compa Franklin, PT Time Calculation: 31 mins

## 2019-02-27 NOTE — PROGRESS NOTES
ADULT PROTOCOL: JET AEROSOL  REASSESSMENT Patient  Elvia Still     77 y.o.   female     2/27/2019  2:28 PM 
 
Breath Sounds Pre Procedure:  Clear Breath Sounds Post Procedure:  Clear Breathing pattern: Pre procedure Breathing Pattern: Regular Post procedure Breathing Pattern: Regular Heart Rate: Pre procedure Pulse: 97 
         Post procedure Pulse: 100 Resp Rate: Pre procedure Respirations: 18 Post procedure Respirations: 18 
 
     
 
Cough: Pre procedure Cough: Non-productive Post procedure Cough: Non-productive Oxygen: O2 Device: Room air SpO2: Pre procedure SpO2: 97 % Post procedure SpO2: 96 % Nebulizer Therapy: Current medications Aerosolized Medications: DuoNeb Smoking History: Never Problem List:  
Patient Active Problem List  
Diagnosis Code  CHF (congestive heart failure) ([facility]) I50.9  Pneumonia J18.9 Respiratory Therapist: RT Iván

## 2019-02-28 LAB
ANION GAP SERPL CALC-SCNC: 12 MMOL/L (ref 5–15)
BUN SERPL-MCNC: 13 MG/DL (ref 6–20)
BUN/CREAT SERPL: 17 (ref 12–20)
CALCIUM SERPL-MCNC: 8.6 MG/DL (ref 8.5–10.1)
CHLORIDE SERPL-SCNC: 102 MMOL/L (ref 97–108)
CO2 SERPL-SCNC: 24 MMOL/L (ref 21–32)
CREAT SERPL-MCNC: 0.78 MG/DL (ref 0.55–1.02)
ERYTHROCYTE [DISTWIDTH] IN BLOOD BY AUTOMATED COUNT: 13.9 % (ref 11.5–14.5)
GLUCOSE BLD STRIP.AUTO-MCNC: 134 MG/DL (ref 65–100)
GLUCOSE BLD STRIP.AUTO-MCNC: 142 MG/DL (ref 65–100)
GLUCOSE BLD STRIP.AUTO-MCNC: 143 MG/DL (ref 65–100)
GLUCOSE BLD STRIP.AUTO-MCNC: 175 MG/DL (ref 65–100)
GLUCOSE SERPL-MCNC: 161 MG/DL (ref 65–100)
HCT VFR BLD AUTO: 35.8 % (ref 35–47)
HGB BLD-MCNC: 11.5 G/DL (ref 11.5–16)
MAGNESIUM SERPL-MCNC: 1.9 MG/DL (ref 1.6–2.4)
MCH RBC QN AUTO: 29.6 PG (ref 26–34)
MCHC RBC AUTO-ENTMCNC: 32.1 G/DL (ref 30–36.5)
MCV RBC AUTO: 92.3 FL (ref 80–99)
NRBC # BLD: 0 K/UL (ref 0–0.01)
NRBC BLD-RTO: 0 PER 100 WBC
PLATELET # BLD AUTO: 297 K/UL (ref 150–400)
PMV BLD AUTO: 9.2 FL (ref 8.9–12.9)
POTASSIUM SERPL-SCNC: 3.8 MMOL/L (ref 3.5–5.1)
RBC # BLD AUTO: 3.88 M/UL (ref 3.8–5.2)
SERVICE CMNT-IMP: ABNORMAL
SODIUM SERPL-SCNC: 138 MMOL/L (ref 136–145)
WBC # BLD AUTO: 6.6 K/UL (ref 3.6–11)

## 2019-02-28 PROCEDURE — 36415 COLL VENOUS BLD VENIPUNCTURE: CPT

## 2019-02-28 PROCEDURE — 80048 BASIC METABOLIC PNL TOTAL CA: CPT

## 2019-02-28 PROCEDURE — 87040 BLOOD CULTURE FOR BACTERIA: CPT

## 2019-02-28 PROCEDURE — 74011250636 HC RX REV CODE- 250/636: Performed by: INTERNAL MEDICINE

## 2019-02-28 PROCEDURE — 65660000000 HC RM CCU STEPDOWN

## 2019-02-28 PROCEDURE — 85027 COMPLETE CBC AUTOMATED: CPT

## 2019-02-28 PROCEDURE — 74011250637 HC RX REV CODE- 250/637: Performed by: INTERNAL MEDICINE

## 2019-02-28 PROCEDURE — 74011636637 HC RX REV CODE- 636/637: Performed by: INTERNAL MEDICINE

## 2019-02-28 PROCEDURE — 97530 THERAPEUTIC ACTIVITIES: CPT

## 2019-02-28 PROCEDURE — 74011250636 HC RX REV CODE- 250/636: Performed by: EMERGENCY MEDICINE

## 2019-02-28 PROCEDURE — 74011250637 HC RX REV CODE- 250/637: Performed by: HOSPITALIST

## 2019-02-28 PROCEDURE — 74011000250 HC RX REV CODE- 250: Performed by: NURSE PRACTITIONER

## 2019-02-28 PROCEDURE — 94640 AIRWAY INHALATION TREATMENT: CPT

## 2019-02-28 PROCEDURE — 97116 GAIT TRAINING THERAPY: CPT

## 2019-02-28 PROCEDURE — 82962 GLUCOSE BLOOD TEST: CPT

## 2019-02-28 PROCEDURE — 74011250636 HC RX REV CODE- 250/636: Performed by: HOSPITALIST

## 2019-02-28 PROCEDURE — 83735 ASSAY OF MAGNESIUM: CPT

## 2019-02-28 PROCEDURE — 74011000258 HC RX REV CODE- 258: Performed by: INTERNAL MEDICINE

## 2019-02-28 RX ORDER — VANCOMYCIN 1.75 GRAM/500 ML IN 0.9 % SODIUM CHLORIDE INTRAVENOUS
1750 ONCE
Status: COMPLETED | OUTPATIENT
Start: 2019-02-28 | End: 2019-03-01

## 2019-02-28 RX ADMIN — ENOXAPARIN SODIUM 90 MG: 100 INJECTION, SOLUTION INTRAVENOUS; SUBCUTANEOUS at 20:52

## 2019-02-28 RX ADMIN — FUROSEMIDE 40 MG: 40 TABLET ORAL at 08:31

## 2019-02-28 RX ADMIN — GUAIFENESIN 600 MG: 600 TABLET, EXTENDED RELEASE ORAL at 17:29

## 2019-02-28 RX ADMIN — IPRATROPIUM BROMIDE AND ALBUTEROL SULFATE 3 ML: .5; 3 SOLUTION RESPIRATORY (INHALATION) at 14:47

## 2019-02-28 RX ADMIN — Medication 10 ML: at 13:12

## 2019-02-28 RX ADMIN — Medication 10 ML: at 05:42

## 2019-02-28 RX ADMIN — METOPROLOL TARTRATE 100 MG: 50 TABLET ORAL at 17:29

## 2019-02-28 RX ADMIN — ASPIRIN 81 MG: 81 TABLET, COATED ORAL at 08:31

## 2019-02-28 RX ADMIN — METOPROLOL TARTRATE 100 MG: 50 TABLET ORAL at 08:31

## 2019-02-28 RX ADMIN — IPRATROPIUM BROMIDE AND ALBUTEROL SULFATE 3 ML: .5; 3 SOLUTION RESPIRATORY (INHALATION) at 01:51

## 2019-02-28 RX ADMIN — CEFTRIAXONE SODIUM 1 G: 1 INJECTION, POWDER, FOR SOLUTION INTRAMUSCULAR; INTRAVENOUS at 05:41

## 2019-02-28 RX ADMIN — GUANFACINE HYDROCHLORIDE 2 MG: 1 TABLET ORAL at 21:33

## 2019-02-28 RX ADMIN — INSULIN LISPRO 2 UNITS: 100 INJECTION, SOLUTION INTRAVENOUS; SUBCUTANEOUS at 08:28

## 2019-02-28 RX ADMIN — LEVOTHYROXINE SODIUM 125 MCG: 125 TABLET ORAL at 08:30

## 2019-02-28 RX ADMIN — Medication 10 ML: at 20:52

## 2019-02-28 RX ADMIN — IPRATROPIUM BROMIDE AND ALBUTEROL SULFATE 3 ML: .5; 3 SOLUTION RESPIRATORY (INHALATION) at 08:21

## 2019-02-28 RX ADMIN — ENOXAPARIN SODIUM 90 MG: 100 INJECTION, SOLUTION INTRAVENOUS; SUBCUTANEOUS at 08:29

## 2019-02-28 RX ADMIN — VANCOMYCIN HYDROCHLORIDE 1750 MG: 10 INJECTION, POWDER, LYOPHILIZED, FOR SOLUTION INTRAVENOUS at 18:00

## 2019-02-28 RX ADMIN — INSULIN LISPRO 2 UNITS: 100 INJECTION, SOLUTION INTRAVENOUS; SUBCUTANEOUS at 17:30

## 2019-02-28 RX ADMIN — IPRATROPIUM BROMIDE AND ALBUTEROL SULFATE 3 ML: .5; 3 SOLUTION RESPIRATORY (INHALATION) at 21:30

## 2019-02-28 RX ADMIN — LOSARTAN POTASSIUM 100 MG: 100 TABLET, FILM COATED ORAL at 08:31

## 2019-02-28 RX ADMIN — AZITHROMYCIN MONOHYDRATE 500 MG: 500 INJECTION, POWDER, LYOPHILIZED, FOR SOLUTION INTRAVENOUS at 04:24

## 2019-02-28 RX ADMIN — PANTOPRAZOLE SODIUM 40 MG: 40 TABLET, DELAYED RELEASE ORAL at 08:32

## 2019-02-28 RX ADMIN — POLYETHYLENE GLYCOL 3350 17 G: 17 POWDER, FOR SOLUTION ORAL at 08:32

## 2019-02-28 RX ADMIN — GUAIFENESIN 600 MG: 600 TABLET, EXTENDED RELEASE ORAL at 08:31

## 2019-02-28 NOTE — PROGRESS NOTES
Pharmacy Automatic Renal Dosing Protocol - Antimicrobials Indication for Antimicrobials: Bacteremia Current Regimen of Each Antimicrobial: 
Vancomycin 1750 mg x 1 then 1000 mg Q12H (Start Date ; Day # 1) Ceftriaxone 1 gmr Q24H (, Day 7) Azithromycin 500 mg Q24 (, Day 7) Previous Antimicrobial Therapy: 
 
Vancomycin Goal Level: 15-20 mcg/ml Vancomycin Levels Date Dose & Interval Measured (mcg/mL) Steady State (mcg/mL) Date & time of next level:  
 
Significant Cultures:  
 
Radiology / Imaging results: (X-ray, CT scan or MRI):  
 
 
Labs: 
Recent Labs  
  19 
0433 19 
0358 CREA 0.78 0.83 BUN 13 14 WBC 6.6  -- Temp (24hrs), Av °F (36.7 °C), Min:97.7 °F (36.5 °C), Max:98.5 °F (36.9 °C) Paralysis, amputations, malnutrition:  
Creatinine Clearance (mL/min) or Dialysis: 59 Impression/Plan:  
Vancomycin 1750 mg x 1 then 1000 mg Q12H with anticipated trough of 17.6 mcg/ml () Day 8 of Ceftriaxone and azithromycin. [facility] daily Pharmacy will follow daily and adjust medications as appropriate for renal function and/or serum levels. Thank you, 
Mary Silva, [facility] Recommended duration of therapy 
http://[facility]/[place]/[facility]/Pharmacy/Clinical%20Companion/[email or web address]%[email or web address]. docx Renal Dosing 
http://[facility]/[place]/[facility]/Pharmacy/Clinical%20Companion/Renal%[email or web address]. pdf

## 2019-02-28 NOTE — PROGRESS NOTES
7:30 AM Bedside report received from [address] [facility]. 
 
2:00 PM Paged Dr. Juan A Dominguez to clarify if repeat blood cultures should be drawn based on blood cultures + in 1 of 4 bottles. Awaiting new orders. 2:30 PM 2nd page to Dr. Juan A Dominguez. 4:00 PM 3rd page to Dr. Juan A Dominguez. 
 
5:00 PM Spoke with Dr. Maria Dolores Espino regarding blood culture results. MD to place new orders.

## 2019-02-28 NOTE — PROGRESS NOTES
PCU SHIFT NURSING NOTE Bedside and Verbal shift change report given to Jamar Remy RN (oncoming nurse) by Elidia Love RN (offgoing nurse). Report included the following information SBAR, Kardex, MAR and Recent Results. Shift Summary:  
0720: Bedside and Verbal shift change report given to JEANMARIE Menon (oncoming nurse) by Jamar Remy RN (offgoing nurse). Report included the following information SBAR, Kardex, MAR and Recent Results. Admission Date 2/22/2019 Admission Diagnosis CHF (congestive heart failure) ([facility] [I50.9] Consults IP CONSULT TO CARDIOLOGY Consults []PT []OT []Speech  
[]Case Management  
  
[] Palliative Cardiac Monitoring Order []Yes []No  
 
IV drips []Yes Drip:                            Dose: 
Drip:                            Dose: 
Drip:                            Dose:  
[]No  
 
GI Prophylaxis []Yes []No  
 
 
 
DVT Prophylaxis SCDs:  Sequential Compression Device: Bilateral  
  Patient Refused VTE Prophylaxis: Yes(during the day) Juan stockings:     
  
[] Medication []Contraindicated []None Activity Level Activity Level: Up with Assistance, [facility] Room Privileges Activity Assistance: Partial (one person) Purposeful Rounding every 1-2 hour? []Yes Turner Score  Total Score: 3 Bed Alarm (If score 3 or >) []Yes  
[] Refused (See signed refusal form in chart) Fabricio Score  Fabricio Score: 18 Fabricio Score (if score 14 or less) []PMT consult  
[]Wound Care consult []Specialty bed  
[] Nutrition consult Needs prior to discharge:  
Home O2 required:   
[]Yes []No  
 If yes, how much O2 required? Other:  
 Last Bowel Movement: Last Bowel Movement Date: 02/27/19 Influenza Vaccine Received Flu Vaccine for Current Season (usually Sept-March): Yes Pneumonia Vaccine Diet Active Orders Diet DIET DIABETIC CONSISTENT CARB Regular LDAs Peripheral IV 02/22/19 Right;Posterior Wrist (Active) Site Assessment Clean, dry, & intact 2/27/2019  8:27 PM  
Phlebitis Assessment 0 2/27/2019  8:27 PM  
Infiltration Assessment 0 2/27/2019  8:27 PM  
Dressing Status Clean, dry, & intact 2/27/2019  8:27 PM  
Dressing Type Tape;Transparent 2/27/2019  8:27 PM  
Hub Color/Line Status Pink;Flushed 2/27/2019  8:27 PM  
Action Taken Open ports on tubing capped 2/26/2019  4:25 PM  
Alcohol Cap Used Yes 2/26/2019  4:25 PM  
   
Peripheral IV 02/24/19 Right Hand (Active) Site Assessment Clean, dry, & intact 2/27/2019  8:27 PM  
Phlebitis Assessment 0 2/27/2019  8:27 PM  
Infiltration Assessment 0 2/27/2019  8:27 PM  
Dressing Status Clean, dry, & intact 2/27/2019  8:27 PM  
Dressing Type Tape;Transparent 2/27/2019  8:27 PM  
Hub Color/Line Status Pink;Flushed 2/27/2019  8:27 PM  
Action Taken Open ports on tubing capped 2/26/2019  4:25 PM  
Alcohol Cap Used Yes 2/26/2019  4:25 PM  
                  
Urinary Catheter Intake & Output Date 02/27/19 0700 - 02/28/19 3896 02/28/19 0700 - 03/01/19 1089 Shift 6408-8775 1606-5280 24 Hour Total 5852-1173 0080-7878 24 Hour Total  
INTAKE  
P.O. 190  190     
  P.O. 190  190 Shift Total(mL/kg) 190(2.1)  190(2.1) OUTPUT Urine(mL/kg/hr) Urine Occurrence(s)  1 x 1 x Stool Stool Occurrence(s) 1 x  1 x Shift Total(mL/kg)   190 Weight (kg) 90 90 90 90 90 90 Readmission Risk Assessment Tool Score Medium Risk 15 Total Score 3 Has Seen PCP in Last 6 Months (Yes=3, No=0) 3 Patient Length of Stay (>5 days = 3)  
 5 Pt. Coverage (Medicare=5 , Medicaid, or Self-Pay=4) 4 Charlson Comorbidity Score (Age + Comorbid Conditions) Criteria that do not apply:  
 . Living with Significant Other. Assisted Living. LTAC. SNF. or  
Rehab  
 IP Visits Last 12 Months (1-3=4, 4=9, >4=11) Expected Length of Stay 4d 4h  
 Actual Length of Stay 6

## 2019-02-28 NOTE — PROGRESS NOTES
Hospitalist Progress Note NAME: Gilma Hope :  1940 MRN:  [id number] Assessment / Plan: 
. AF with RVR Hear rate is still not well controlled, but improving. Initially treated with cardizem gtt. Appreciate cardiology input. Recommended to stop cardizem gtt. Cont. Metoprolol, will increase the dose and cont lovenox. .   Community Acquired Pneumonia,  
Cont IV abx with azithro/ceftriaxone. Follow up sputum culture. Improving slowly. Wheezing is better. Will ambulate and check pulse Ox. Stop Abx in am.  
 
.   DM Cont SSI w POCs. Hold home metformin and glimeperide [place] Hypertension Continue home  Cozaar 100 mg daily [place] Hypothyroidism Continue Synthyroid 125 mcg daily, elevated TSH noted. FT4 nL, repeat TSH outpatient once acute medical issues addressed [place] Hyperlipidemia Continue simvastatin  
  
 
Code Status: Full Surrogate Decision Maker: 
Abdullahi Fernández  
  
DVT Prophylaxis: Lovenox GI Prophylaxis: not indicated 
  
Baseline: independent Subjective: Chief Complaint / Reason for Physician Visit Sitting in bedside chair. sats ok on RA at rest. Pt tachy with movement Discussed with RN events overnight. Objective: VITALS:  
Last 24hrs VS reviewed since prior progress note. Most recent are: 
Patient Vitals for the past 24 hrs: 
 Temp Pulse Resp BP SpO2  
19 0828  100  153/71   
19 0821     97 % 19 0737  82     
19 0721 97.9 °F (36.6 °C) (!) 102 18 (!) 129/98 97 % 19 0427 98 °F (36.7 °C) 86 18 128/74 96 % 19 2224 98.5 °F (36.9 °C) 92 20 154/87 99 % 19 2147  79  157/73   
19 2027 98 °F (36.7 °C) 90 16 (!) 116/91 100 % 19     97 % 19 1544 98 °F (36.7 °C) 99 16 122/65 97 % 19 1318  (!) 115     
19 1313     97 % 19 1310  98     
19 1309  (!) 123     
19 1240  (!) 110    02/27/19 1239  (!) 124     
02/27/19 1237  (!) 125     
02/27/19 1234  (!) 130     
02/27/19 1232  (!) 120     
02/27/19 1225 98 °F (36.7 °C) (!) 107 18 143/88 100 % 02/27/19 1223  (!) 134     
02/27/19 1222  (!) 130     
02/27/19 1216  (!) 104     
02/27/19 1214  (!) 128     
02/27/19 1212  (!) 103     
02/27/19 1210  (!) 123     
02/27/19 1207  (!) 135     
02/27/19 1203  (!) 121     
02/27/19 1159  (!) 105     
02/27/19 1155  (!) 102     
02/27/19 1152 97.8 °F (36.6 °C) (!) 121 16 (!) 141/108 96 % 02/27/19 1147  (!) 134     
02/27/19 1144  (!) 135     
02/27/19 1143  (!) 146     
02/27/19 1137  (!) 101     
02/27/19 1134  (!) 136     
02/27/19 1129  (!) 128     
02/27/19 1126  (!) 130     
02/27/19 1111  (!) 140     
02/27/19 1106  (!) 131     
02/27/19 1103  (!) 147     
02/27/19 1101  (!) 142     
02/27/19 1044  (!) 107     
02/27/19 1034  (!) 130     
02/27/19 1027  (!) 104     
02/27/19 1026  99     
02/27/19 1025  93     
02/27/19 1021  (!) 120     
02/27/19 1002  97     
02/27/19 0945  (!) 128     
02/27/19 0926  (!) 123    Intake/Output Summary (Last 24 hours) at 2/28/2019 5232 Last data filed at 2/28/2019 3652 Gross per 24 hour Intake 250 ml Output  Net 250 ml PHYSICAL EXAM: 
General: WD, WN. Alert, cooperative, no acute distress   
EENT:  EOMI. Anicteric sclerae. MMM Resp:  Coarse breath sound. No accessory muscle use CV:  IRIR,  No edema, Tachycardia. GI:  Soft, Non distended, Non tender.  +Bowel sounds Neurologic:  Alert and oriented X 3, normal speech, Psych:   Good insight. Not anxious nor agitated Skin:  No rashes. No jaundice Reviewed most current lab test results and cultures  YES Reviewed most current radiology test results   YES Review and summation of old records today    NO 
 Reviewed patient's current orders and MAR    YES 
PMH/SH reviewed - no change compared to H&P 
________________________________________________________________________ Care Plan discussed with: 
  Comments Patient x Family RN x Care Manager Consultant     
                    
________________________________________________________________________ Comments >50% of visit spent in counseling and coordination of care x   
________________________________________________________________________ Jewel Mane MD  
 
Procedures: see electronic medical records for all procedures/Xrays and details which were not copied into this note but were reviewed prior to creation of Plan. LABS: 
I reviewed today's most current labs and imaging studies. Pertinent labs include: 
Recent Labs  
  02/28/19 
0433 WBC 6.6 HGB 11.5 HCT 35.8  Recent Labs  
  02/28/19 
0433 02/27/19 
0358  138  
K 3.8 3.7  102 CO2 24 28 * 164* BUN 13 14 CREA 0.78 0.83 CA 8.6 9.3 MG 1.9 2.2 Signed: Jewel Mane MD

## 2019-02-28 NOTE — PROGRESS NOTES
Problem: Mobility Impaired (Adult and Pediatric) Goal: *Acute Goals and Plan of Care (Insert Text) Physical Therapy Goals Initiated 2/22/2019 1. Patient will move from supine to sit and sit to supine , scoot up and down and roll side to side in bed with independence within 7 day(s). 2.  Patient will transfer from bed to chair and chair to bed with independence using the least restrictive device within 7 day(s). 3.  Patient will perform sit to stand with independence within 7 day(s). 4.  Patient will ambulate with modified independence for 150 feet with the least restrictive device within 7 day(s). 5.  Patient will ascend/descend 5 stairs with B handrail(s) with minimal assistance/contact guard assist within 7 day(s). physical Therapy TREATMENT Patient: Amber Retana (77 y.o. female) Date: 2/28/2019 Diagnosis: CHF (congestive heart failure) (formerly [facility]) [I50.9] Pneumonia Precautions:  Fall Chart, physical therapy assessment, plan of care and goals were reviewed. ASSESSMENT: 
Pt received sitting in recliner chair and agreeable to PT intervention. Pt cleared by nursing for mobility. Pt with improvements in activity tolerance and with slow, but good progress towards therapy goals this date. HR 87 bpm, SaO2 98% at rest on RA. Pt able to doff slippers and joana shoes independently prior to ambulation with increased time and HR stable. Sit<>stand transfers performed with SBA. Standing balance good-fair with RW support this date. She ambulated 72' with CGA and RW support, demonstrating slow gait speed, shuffled gait pattern, mild trunk flexion, mild path deviations, and decreased step length and clearance LAN throughout. No overt LOB noted, however patient needed increased VCs to improve step clearance and to improve neck/trunk extension, along with environmental awareness while ambulating in busy hallway.  bpm at the highest during ambulation (remains in a-fib).  C/o fatigue towards last ~ 15' of gait, however she was able to remain standing x ~ 30 seconds in static stance prior to returning to seated position in chair. SaO2 96%, HR 98 bpm post-gait training. Provided extensive education on activity pacing, energy conservation, fall prevention, possible use of rollator in the community, and having assistance as needed from family; pt verbalized understanding. Pt reports that her sister has rollator she can use if needed once home. Pt was left sitting in bedside chair with all needs met, RN aware, and VSS following therapy session. Recommend pt return home with HHPT, use of RW, and initial 24/7 supervision at discharge. Plan for gait training trial with rollator during next session. Recommend with nursing patient to complete as able in order to maintain strength, endurance and independence: OOB to chair 3x/day and walking daily with CG assist and RW. Thank you for your assistance. Progression toward goals: 
[]    Improving appropriately and progressing toward goals [x]    Improving slowly and progressing toward goals 
[]    Not making progress toward goals and plan of care will be adjusted PLAN: 
Patient continues to benefit from skilled intervention to address the above impairments. Continue treatment per established plan of care. Discharge Recommendations:  Home Health and initial 24/7 supervision/assist 
Further Equipment Recommendations for Discharge:  None - patient owns 2 RWs and sister has rollator that patient can use if needed SUBJECTIVE:  
Patient stated I am tired.  OBJECTIVE DATA SUMMARY:  
Critical Behavior: 
Neurologic State: Alert, Appropriate for age Orientation Level: Oriented X4 Cognition: Appropriate decision making, Appropriate for age attention/concentration, Appropriate safety awareness, Follows commands Functional Mobility Training: 
Bed Mobility: 
 Received and returned to recliner chair. Transfers: Sit to Stand: Stand-by assistance Stand to Sit: Stand-by assistance Bed to Chair: Contact guard assistance Balance: 
Sitting: Intact Standing: Impaired Standing - Static: Good;Constant support Standing - Dynamic : FairAmbulation/Gait Training: 
Distance (ft): 65 Feet (ft) Assistive Device: Gait belt;Walker, rolling Ambulation - Level of Assistance: Contact guard assistance;Assist x1;Additional time; Adaptive equipment Gait Abnormalities: Decreased step clearance;Shuffling gait(mild trunk flexion) Speed/Liv: Shuffled; Slow Step Length: Left shortened;Right shortened Pain: 
Pain Scale 1: Numeric (0 - 10) Pain Intensity 1: 0 Activity Tolerance:  
Improving - increased gait distance; HR  bpm, SaO2 >92% on RA; increased fatigue following activity Please refer to the flowsheet for vital signs taken during this treatment. After treatment:  
[x]    Patient left in no apparent distress sitting up in chair 
[]    Patient left in no apparent distress in bed 
[x]    Call bell left within reach [x]    Nursing notified 
[]    Caregiver present 
[]    Bed alarm activated COMMUNICATION/COLLABORATION:  
The patients plan of care was discussed with: Physical Therapist and Registered Nurse Parker Andujar, PT, DPT Time Calculation: 33 mins

## 2019-02-28 NOTE — PROGRESS NOTES
ADULT PROTOCOL: JET AEROSOL  REASSESSMENT Patient  Mary Castillo     77 y.o.   female     2/28/2019  3:20 PM 
 
Breath Sounds Pre Procedure:  Clear Breath Sounds Post Procedure:  Clear Breathing pattern: Pre procedure Breathing Pattern: Regular Post procedure Breathing Pattern: Regular Heart Rate: Pre procedure Pulse: 92 
         Post procedure Pulse: 105 Resp Rate: Pre procedure Respirations: 18 Post procedure Respirations: 18 
 
     
 
Cough: Pre procedure Cough: Non-productive Post procedure Cough: Non-productive Oxygen: O2 Device: Room air SpO2: Pre procedure SpO2: 94 % Post procedure SpO2: 96 % Nebulizer Therapy: Current medications Aerosolized Medications: DuoNeb Smoking History: Never Problem List:  
Patient Active Problem List  
Diagnosis Code  CHF (congestive heart failure) (Formerly [facility]) I50.9  Pneumonia J18.9 Respiratory Therapist: RT Iván

## 2019-02-28 NOTE — PROGRESS NOTES
Pt will have [facility] at d/c. CM will continue to follow up with [facility] AT [facility] and pt of the following. SARAH Oconnell, [facility]

## 2019-03-01 LAB
ANION GAP SERPL CALC-SCNC: 8 MMOL/L (ref 5–15)
BUN SERPL-MCNC: 13 MG/DL (ref 6–20)
BUN/CREAT SERPL: 19 (ref 12–20)
CALCIUM SERPL-MCNC: 8.1 MG/DL (ref 8.5–10.1)
CHLORIDE SERPL-SCNC: 103 MMOL/L (ref 97–108)
CO2 SERPL-SCNC: 27 MMOL/L (ref 21–32)
CREAT SERPL-MCNC: 0.69 MG/DL (ref 0.55–1.02)
GLUCOSE BLD STRIP.AUTO-MCNC: 147 MG/DL (ref 65–100)
GLUCOSE BLD STRIP.AUTO-MCNC: 147 MG/DL (ref 65–100)
GLUCOSE BLD STRIP.AUTO-MCNC: 174 MG/DL (ref 65–100)
GLUCOSE BLD STRIP.AUTO-MCNC: 203 MG/DL (ref 65–100)
GLUCOSE SERPL-MCNC: 167 MG/DL (ref 65–100)
POTASSIUM SERPL-SCNC: 3.8 MMOL/L (ref 3.5–5.1)
SERVICE CMNT-IMP: ABNORMAL
SODIUM SERPL-SCNC: 138 MMOL/L (ref 136–145)

## 2019-03-01 PROCEDURE — 97530 THERAPEUTIC ACTIVITIES: CPT

## 2019-03-01 PROCEDURE — 74011250637 HC RX REV CODE- 250/637: Performed by: INTERNAL MEDICINE

## 2019-03-01 PROCEDURE — 74011250636 HC RX REV CODE- 250/636: Performed by: INTERNAL MEDICINE

## 2019-03-01 PROCEDURE — 74011250636 HC RX REV CODE- 250/636: Performed by: HOSPITALIST

## 2019-03-01 PROCEDURE — 80048 BASIC METABOLIC PNL TOTAL CA: CPT

## 2019-03-01 PROCEDURE — 94640 AIRWAY INHALATION TREATMENT: CPT

## 2019-03-01 PROCEDURE — 74011636637 HC RX REV CODE- 636/637: Performed by: INTERNAL MEDICINE

## 2019-03-01 PROCEDURE — 36415 COLL VENOUS BLD VENIPUNCTURE: CPT

## 2019-03-01 PROCEDURE — 74011000250 HC RX REV CODE- 250: Performed by: NURSE PRACTITIONER

## 2019-03-01 PROCEDURE — 74011000258 HC RX REV CODE- 258: Performed by: INTERNAL MEDICINE

## 2019-03-01 PROCEDURE — 97116 GAIT TRAINING THERAPY: CPT

## 2019-03-01 PROCEDURE — 65660000000 HC RM CCU STEPDOWN

## 2019-03-01 PROCEDURE — 82962 GLUCOSE BLOOD TEST: CPT

## 2019-03-01 PROCEDURE — 74011250637 HC RX REV CODE- 250/637: Performed by: HOSPITALIST

## 2019-03-01 PROCEDURE — 74011250636 HC RX REV CODE- 250/636: Performed by: EMERGENCY MEDICINE

## 2019-03-01 RX ORDER — IPRATROPIUM BROMIDE AND ALBUTEROL SULFATE 2.5; .5 MG/3ML; MG/3ML
3 SOLUTION RESPIRATORY (INHALATION)
Status: DISCONTINUED | OUTPATIENT
Start: 2019-03-01 | End: 2019-03-02 | Stop reason: HOSPADM

## 2019-03-01 RX ORDER — DILTIAZEM HYDROCHLORIDE 120 MG/1
120 CAPSULE, COATED, EXTENDED RELEASE ORAL DAILY
Status: DISCONTINUED | OUTPATIENT
Start: 2019-03-01 | End: 2019-03-02 | Stop reason: HOSPADM

## 2019-03-01 RX ADMIN — VANCOMYCIN HYDROCHLORIDE 1000 MG: 1 INJECTION, POWDER, LYOPHILIZED, FOR SOLUTION INTRAVENOUS at 07:14

## 2019-03-01 RX ADMIN — INSULIN LISPRO 2 UNITS: 100 INJECTION, SOLUTION INTRAVENOUS; SUBCUTANEOUS at 11:30

## 2019-03-01 RX ADMIN — INSULIN LISPRO 3 UNITS: 100 INJECTION, SOLUTION INTRAVENOUS; SUBCUTANEOUS at 17:14

## 2019-03-01 RX ADMIN — Medication 10 ML: at 07:15

## 2019-03-01 RX ADMIN — IPRATROPIUM BROMIDE AND ALBUTEROL SULFATE 3 ML: .5; 3 SOLUTION RESPIRATORY (INHALATION) at 03:10

## 2019-03-01 RX ADMIN — ATORVASTATIN CALCIUM 40 MG: 10 TABLET, FILM COATED ORAL at 21:47

## 2019-03-01 RX ADMIN — ENOXAPARIN SODIUM 90 MG: 100 INJECTION, SOLUTION INTRAVENOUS; SUBCUTANEOUS at 21:47

## 2019-03-01 RX ADMIN — IPRATROPIUM BROMIDE AND ALBUTEROL SULFATE 3 ML: .5; 3 SOLUTION RESPIRATORY (INHALATION) at 08:22

## 2019-03-01 RX ADMIN — DILTIAZEM HYDROCHLORIDE 120 MG: 120 CAPSULE, COATED, EXTENDED RELEASE ORAL at 11:00

## 2019-03-01 RX ADMIN — ASPIRIN 81 MG: 81 TABLET, COATED ORAL at 08:39

## 2019-03-01 RX ADMIN — GUANFACINE HYDROCHLORIDE 2 MG: 1 TABLET ORAL at 21:47

## 2019-03-01 RX ADMIN — VANCOMYCIN HYDROCHLORIDE 1000 MG: 1 INJECTION, POWDER, LYOPHILIZED, FOR SOLUTION INTRAVENOUS at 19:25

## 2019-03-01 RX ADMIN — GUAIFENESIN 600 MG: 600 TABLET, EXTENDED RELEASE ORAL at 17:14

## 2019-03-01 RX ADMIN — INSULIN LISPRO 2 UNITS: 100 INJECTION, SOLUTION INTRAVENOUS; SUBCUTANEOUS at 08:40

## 2019-03-01 RX ADMIN — LOSARTAN POTASSIUM 100 MG: 100 TABLET, FILM COATED ORAL at 08:39

## 2019-03-01 RX ADMIN — POLYETHYLENE GLYCOL 3350 17 G: 17 POWDER, FOR SOLUTION ORAL at 08:40

## 2019-03-01 RX ADMIN — LEVOTHYROXINE SODIUM 125 MCG: 125 TABLET ORAL at 07:47

## 2019-03-01 RX ADMIN — AZITHROMYCIN MONOHYDRATE 500 MG: 500 INJECTION, POWDER, LYOPHILIZED, FOR SOLUTION INTRAVENOUS at 04:37

## 2019-03-01 RX ADMIN — ENOXAPARIN SODIUM 90 MG: 100 INJECTION, SOLUTION INTRAVENOUS; SUBCUTANEOUS at 08:39

## 2019-03-01 RX ADMIN — Medication 10 ML: at 14:00

## 2019-03-01 RX ADMIN — GUAIFENESIN 600 MG: 600 TABLET, EXTENDED RELEASE ORAL at 08:39

## 2019-03-01 RX ADMIN — Medication 10 ML: at 21:48

## 2019-03-01 RX ADMIN — FUROSEMIDE 40 MG: 40 TABLET ORAL at 08:39

## 2019-03-01 RX ADMIN — PANTOPRAZOLE SODIUM 40 MG: 40 TABLET, DELAYED RELEASE ORAL at 07:47

## 2019-03-01 RX ADMIN — CEFTRIAXONE SODIUM 1 G: 1 INJECTION, POWDER, FOR SOLUTION INTRAMUSCULAR; INTRAVENOUS at 05:49

## 2019-03-01 RX ADMIN — METOPROLOL TARTRATE 100 MG: 50 TABLET ORAL at 17:14

## 2019-03-01 RX ADMIN — METOPROLOL TARTRATE 100 MG: 50 TABLET ORAL at 08:39

## 2019-03-01 NOTE — PROGRESS NOTES
Hospitalist Progress Note NAME: Maye Agarwal :  1940 MRN:  [id number] Assessment / Plan: 
. AF with RVR Hear rate is improving. Initially treated with cardizem gtt. Appreciate cardiology input. Recommended to stop cardizem gtt. Cont. Metoprolol, will increase the dose and cont lovenox. .   Community Acquired Pneumonia,  
Cont IV abx with azithro/ceftriaxone. Follow up sputum culture. Improving slowly. Wheezing is better. Will ambulate and check pulse Ox. Stop Abx. .   Staph coag bacteremia Most likely contamination. Pt placed on Vancomycin. Repeat Cx is pending. .   DM Cont SSI w POCs. Hold home metformin and glimeperide Carlynn Annalise Hypertension Continue home  Cozaar Carlynn Annalsie Hypothyroidism Continue Synthyroid Carlynn Annalise Hyperlipidemia Continue simvastatin  
  
 
Code Status: Full Surrogate Decision Maker: 
Abdullahi Fernández  
  
DVT Prophylaxis: Lovenox GI Prophylaxis: not indicated 
  
Baseline: independent Subjective: Chief Complaint / Reason for Physician Visit Sitting in bedside chair. sats ok on RA at rest. Pt tachy with movement Discussed with RN events overnight. Objective: VITALS:  
Last 24hrs VS reviewed since prior progress note. Most recent are: 
Patient Vitals for the past 24 hrs: 
 Temp Pulse Resp BP SpO2  
19 0822     96 % 19 0713 98 °F (36.7 °C) 84 18 148/88 98 % 19 0427 98 °F (36.7 °C) 86 18 172/84 98 % 19 0310     98 % 19 2250 98 °F (36.7 °C) 91 18 158/82 98 % 19 2132     98 % 19 1925 98.1 °F (36.7 °C) 77 18 145/78 99 % 19 1731  88  139/71   
19 1541 97.7 °F (36.5 °C) 83 18 141/59 100 % 19 1447     94 % 19 1157 98.1 °F (36.7 °C) 90 18 139/67 98 % Intake/Output Summary (Last 24 hours) at 3/1/2019 2111 Last data filed at 3/1/2019 0745 Gross per 24 hour Intake 340 ml Output  Net 340 ml PHYSICAL EXAM: 
 General: WD, WN. Alert, cooperative, no acute distress   
EENT:  EOMI. Anicteric sclerae. MMM Resp:  Coarse breath sound. No accessory muscle use CV:  IRIR,  No edema, Tachycardia. GI:  Soft, Non distended, Non tender.  +Bowel sounds Neurologic:  Alert and oriented X 3, normal speech, Psych:   Good insight. Not anxious nor agitated Skin:  No rashes. No jaundice Reviewed most current lab test results and cultures  YES Reviewed most current radiology test results   YES Review and summation of old records today    NO Reviewed patient's current orders and MAR    YES 
PMH/SH reviewed - no change compared to H&P 
________________________________________________________________________ Care Plan discussed with: 
  Comments Patient x Family RN x Care Manager Consultant     
                    
________________________________________________________________________ Comments >50% of visit spent in counseling and coordination of care x   
________________________________________________________________________ Meme Moy MD  
 
Procedures: see electronic medical records for all procedures/Xrays and details which were not copied into this note but were reviewed prior to creation of Plan. LABS: 
I reviewed today's most current labs and imaging studies. Pertinent labs include: 
Recent Labs  
  02/28/19 
0433 WBC 6.6 HGB 11.5 HCT 35.8  Recent Labs 03/01/19 
5449 02/28/19 
4914 02/27/19 
9365  138 138  
K 3.8 3.8 3.7  102 102 CO2 27 24 28 * 161* 164* BUN 13 13 14 CREA 0.69 0.78 0.83 CA 8.1* 8.6 9.3 MG  --  1.9 2.2 Signed: eMme Moy MD

## 2019-03-01 NOTE — PROGRESS NOTES
Bedside shift change report given to Kiya Nogueira (oncoming nurse) by Renu Zuniga (offgoing nurse). Report included the following information SBAR, Kardex, Intake/Output, MAR, Recent Results and Cardiac Rhythm Afib.

## 2019-03-01 NOTE — PROGRESS NOTES
7:30 AM Bedside report received from [facility]. 11:00 AM Hat placed in toilet for is/os. Patient is very reluctant and states she got rid of the hat a few days ago because \"it feels uncomfortable. \" patient encouraged to keep hat while we are monitoring Is/Os. 11:30 AM Report given to Saurabh Pruitt RN.

## 2019-03-01 NOTE — PROGRESS NOTES
Pharmacy Automatic Renal Dosing Protocol - Antimicrobials Indication for Antimicrobials: Bacteremia Current Regimen of Each Antimicrobial: 
Vancomycin 1750 mg x 1 then 1000 mg Q12H (Start Date ; Day # 2 Previous Antimicrobial Therapy: 
Ceftriaxone 1 gmr Q24H (, Day 7 Azithromycin 500 mg Q24 (, Day 7) Vancomycin Goal Level: 15-20 mcg/ml Vancomycin Levels Date Dose & Interval Measured (mcg/mL) Steady State (mcg/mL) Date & time of next level: 3/2 at 6 AM 
 
Significant Cultures:  
: Blood cx: staph coag negatie : Respiratory cx: NG - Final 
: Blood cx: NG - pending Radiology / Imaging results: (X-ray, CT scan or MRI):  
: CT scan Bilateral pneumonia is greatest in the lingula of the left upper lobe. Labs: 
Recent Labs 19 
0423 19 
6926 19 
3437 CREA 0.69 0.78 0.83 BUN 13 13 14 WBC  --  6.6  -- Temp (24hrs), Av °F (36.7 °C), Min:97.7 °F (36.5 °C), Max:98.2 °F (36.8 °C) Paralysis, amputations, malnutrition:  
Creatinine Clearance (mL/min) or Dialysis: 59 Impression/Plan: ? Vancomycin 1750 mg x 1 then 1000 mg Q12H with anticipated trough of 17.6 mcg/ml () ? Vancomycin trough on 3/2 at 6 AM 
? SCr stable ? Afebrile ? [facility] daily Pharmacy will follow daily and adjust medications as appropriate for renal function and/or serum levels. Thank you, Alex Perla, PHARMD 
 
 
Recommended duration of therapy 
http://[facility]/[place]/[facility]/Pharmacy/Clinical%20Companion/Duration%20of%20ABX%20therapy. docx Renal Dosing 
http://[facility]/[place]/[facility]/Pharmacy/Clinical%20Companion/Renal%[id number]. pdf

## 2019-03-01 NOTE — PROGRESS NOTES
Progress Note 3/1/2019 10:15 AM 
NAME: Shoshana Carias MRN:  [id number] Admit Diagnosis: CHF (congestive heart failure) ([facility] [I50.9] Assessment:  
 
 
Admitted with pneumonia -  Primary diagnosis. Mild pulmonary edema associated. Chronic Atrial Fibrillation . peripheral edema. Diabetes HTN Hyperlipidemia. Elevated TSH   ? Hypothyroid. Echo:  
· Estimated left ventricular ejection fraction is 51 - 55%. Left ventricular mild concentric hypertrophy. · Left atrial cavity size is moderately dilated. · Right atrial cavity size is severely dilated. · Mild aortic valve leaflet calcification present. Mild aortic valve stenosis is present. Mild aortic valve regurgitation is present. · Mitral valve thickening. Moderate to severe mitral valve regurgitation. · Moderate tricuspid valve regurgitation is present. 2/23   OOB in chair. Says she is feeling better today. Lungs still have significant diffuse wheezing. Mild edema. As noted , echo shows normal LV function. 2/25 Feeling better. Lungs are clear to auscultation. Ankles still have some edema OOB in chair , not on O2.  
 
2/26   HR and BP up today . Likely due to withdrawal of B Blocker. Resume Metoprolol . She had not been on anticoagulation as OP . Has been on ASA per med list.  I don't think a NOAC is going to be a good option for her. Risk of falls. Cost.  
 
2/27  Hemodynamics stable. Pulse is better. Will need to titrate her meds. She was on 150 mg/ day on Metoprolol,  Clonidine and Amlodipine PTA. Say may need further med for BP control as she moves along. 2/28  She feels well. Breathing better . Up with walker. Sitting on the bench in the room this AM . HR still gets up some with activity . BP could also tolerate some adjustment. However , if she is ready to discharge , she would be OK from heart standpoint .   HR would not keep her in hospital.  
 
 Blood culture noted. Quite possible contaminate. Hospital team to manage. Plan:  
 
Continue current regimen. Continue  Metoprolol Will add oral Diltiazem to help with P/ BP . Dr Lane Patel on the weekend. [x]        High complexity decision making was performed Subjective:  
 
Dinora Hernandez denies chest pain, dyspnea. Discussed with RN events overnight. Patient Active Problem List  
Diagnosis Code  CHF (congestive heart failure) ([facility]) I50.9  Pneumonia J18.9 Review of Systems: 
 
Symptom Y/N Comments  Symptom Y/N Comments Fever/Chills N   Chest Pain N Poor Appetite N   Edema N   
Cough N   Abdominal Pain N Sputum N   Joint Pain N   
SOB/STARR N   Pruritis/Rash N   
Nausea/vomit N   Tolerating PT/OT Y Diarrhea N   Tolerating Diet Y Constipation N   Other Could NOT obtain due to:   
 
Objective:  
  
Physical Exam: 
 
Last 24hrs VS reviewed since prior progress note. Most recent are: 
 
Visit Vitals /88 (BP 1 Location: Right arm, BP Patient Position: At rest) Pulse 84 Temp 98 °F (36.7 °C) Resp 18 Ht 5' 8\" (1.727 m) Wt 92.1 kg (203 lb 0.7 oz) SpO2 96% BMI 30.87 kg/m² Intake/Output Summary (Last 24 hours) at 3/1/2019 7990 Last data filed at 3/1/2019 0745 Gross per 24 hour Intake 340 ml Output  Net 340 ml General Appearance: Well developed, well nourished, alert & oriented x 3,  
 no acute distress. Ears/Nose/Mouth/Throat: Hearing grossly normal. 
Neck: Supple. Chest: Lungs clear to auscultation bilaterally. Cardiovascular: Regular rate and rhythm, S1S2 normal, no murmur. Abdomen: Soft, non-tender, bowel sounds are active. Extremities: No edema bilaterally. Skin: Warm and dry. PMH/SH reviewed - no change compared to H&P Data Review Telemetry: normal sinus rhythm Lab Data Personally Reviewed: 
 
Recent Labs  
  02/28/19 
9344 WBC 6.6 HGB 11.5 HCT 35.8  LABRCNT(INR:3,PTP:3,APTT:3,) Recent Labs 03/01/19 
7512 02/28/19 
6571 02/27/19 
6618  138 138  
K 3.8 3.8 3.7  102 102 CO2 27 24 28 BUN 13 13 14 CREA 0.69 0.78 0.83 * 161* 164* CA 8.1* 8.6 9.3 MG  --  1.9 2.2 LABRCNT(CPK:3,CpKMB:3,ckndx:3,troiq:3)No results found for: CHOL, CHOLX, CHLST, CHOLV, HDL, LDL, LDLC, DLDLP, TGLX, TRIGL, TRIGP, CHHD, CHHDXLABRCNT(sgot:3,gpt:3,ap:3,tbiL:3,TP:3,ALB:3,GLOB:3,ggt:3,aml:3,amyp:3,lpse:3,hlpse:3)No results for input(s): PH, PCO2, PO2 in the last 72 hours. No results found for: CHOL, CHOLX, CHLST, CHOLV, HDL, LDL, LDLC, DLDLP, TGLX, TRIGL, TRIGP, CHHD, CHHDXMEDTABLETimothy Erika Herrera MD 
No results for input(s): PH, PCO2, PO2 in the last 72 hours. Medications Personally Reviewed: 
 
Current Facility-Administered Medications Medication Dose Route Frequency  vancomycin (VANCOCIN) 1,000 mg in 0.9% sodium chloride (MBP/ADV) 250 mL  1,000 mg IntraVENous Q12H  
 albuterol-ipratropium (DUO-NEB) 2.5 MG-0.5 MG/3 ML  3 mL Nebulization Q6H RT  
 metoprolol tartrate (LOPRESSOR) tablet 100 mg  100 mg Oral BID  furosemide (LASIX) tablet 40 mg  40 mg Oral DAILY  docusate sodium (COLACE) capsule 100 mg  100 mg Oral BID PRN  polyethylene glycol (MIRALAX) packet 17 g  17 g Oral DAILY  glucose chewable tablet 16 g  4 Tab Oral PRN  
 enoxaparin (LOVENOX) injection 90 mg  1 mg/kg SubCUTAneous Q12H  
 guaiFENesin ER (MUCINEX) tablet 600 mg  600 mg Oral BID  
 glucose chewable tablet 16 g  4 Tab Oral PRN  
 dextrose (D50W) injection syrg 12.5-25 g  25-50 mL IntraVENous PRN  
 glucagon (GLUCAGEN) injection 1 mg  1 mg IntraMUSCular PRN  
 insulin lispro (HUMALOG) injection   SubCUTAneous AC&HS  atorvastatin (LIPITOR) tablet 40 mg  40 mg Oral EVERY OTHER DAY  losartan (COZAAR) tablet 100 mg  100 mg Oral DAILY  guanFACINE IR (TENEX) tablet 2 mg  2 mg Oral QHS  HYDROcodone-acetaminophen (NORCO) 5-325 mg per tablet 0.5 Tab  0.5 Tab Oral Q6H PRN  
 aspirin delayed-release tablet 81 mg  81 mg Oral DAILY  pantoprazole (PROTONIX) tablet 40 mg  40 mg Oral ACB  levothyroxine (SYNTHROID) tablet 125 mcg  125 mcg Oral ACB  sodium chloride (NS) flush 5-40 mL  5-40 mL IntraVENous Q8H  
 sodium chloride (NS) flush 5-40 mL  5-40 mL IntraVENous PRN  
 acetaminophen (TYLENOL) tablet 650 mg  650 mg Oral Q6H PRN  
 ondansetron (ZOFRAN) injection 4 mg  4 mg IntraVENous Q8H PRN  
 bisacodyl (DULCOLAX) tablet 5 mg  5 mg Oral DAILY PRN Francisco De Leon MD

## 2019-03-01 NOTE — PROGRESS NOTES
Initial Nutrition Assessment: 
 
INTERVENTIONS/RECOMMENDATIONS:  
· Meals/Snacks: General/healthful diet: Continue current diet ASSESSMENT:  
Patient medically noted for pneumonia, DM, HTN, and AFIB. Chart reviewed for length of stay. PO intake noted per flowsheets; mostly eating >50% of meals overall. BG fairly well controlled at this time. Patient unavailable at time of attempted visit. Continue current diet. Encourage intake of meals. Diet Order: Consistent carb 
% Eaten:   
Patient Vitals for the past 72 hrs: 
 % Diet Eaten 02/28/19 1157 100 % 02/28/19 0828 100 % 02/27/19 1725 50 % 02/27/19 0915 20 % 02/26/19 1800 0 % 02/26/19 1200 80 % Pertinent Medications: [x]Reviewed []Other: Atorvastatin, Lasix, Humalog, Synthroid, Lopressor, Protonix, Miralax Pertinent Labs: [x]Reviewed []Other: [phone] Food Allergies: [x]None []Other Last BM: 2/27   []Active     []Hyperactive  []Hypoactive       [] Absent BS Skin:    [x] Intact   [] Incision  [] Breakdown: [] Edema []Other: Anthropometrics:  
Height: 5' 8\" (172.7 cm) Weight: 92.1 kg (203 lb 0.7 oz) IBW (%IBW):   ( ) UBW (%UBW):   (  %) Last Weight Metrics: 
Weight Loss Metrics 3/1/2019 2/16/2013 3/14/2012 Today's Wt 203 lb 0.7 oz 201 lb 8 oz 204 lb 5.9 oz  
BMI 30.87 kg/m2 30.65 kg/m2 31.08 kg/m2 BMI: Body mass index is 30.87 kg/m². This BMI is indicative of: 
 []Underweight    []Normal    []Overweight    [x] Obesity   [] Extreme Obesity (BMI>40) Estimated Nutrition Needs (Based on):  
8974 Kcals/day(BMR (1444) x 1. 3AF -250kcal) , 74 g(0.8 g/kg bw) Protein Carbohydrate: At Least 130 g/day  Fluids: 1650 mL/day (1ml/kcal) Pt expected to meet estimated nutrient needs: [x]Yes []No 
 
NUTRITION DIAGNOSES:  
Problem:  No nutritional diagnosis at this time Etiology: related to Signs/Symptoms: as evidenced by NUTRITION INTERVENTIONS: 
Meals/Snacks: General/healthful diet GOAL:  
 PO intake >70% of meals next 4-6 days LEARNING NEEDS (Diet, Food/Nutrient-Drug Interaction):  
 [x] None Identified 
 [] Identified and Education Provided/Documented 
 [] Identified and Pt declined/was not appropriate Cultural, Methodist, OR Ethnic Dietary Needs:  
 [x] None Identified 
 [] Identified and Addressed 
 
 [x] Interdisciplinary Care Plan Reviewed/Documented  
 [x] Discharge Planning: CCD/heart healthy diet MONITORING /EVALUATION:  
Food/Nutrient Intake Outcomes: Total energy intake Physical Signs/Symptoms Outcomes: Weight/weight change, Glucose profile NUTRITION RISK:  
 [] High              [] Moderate           [x]  Low  []  Minimal/Uncompromised PT SEEN FOR:  
 []  MD Consult: []Calorie Count []Diabetic Diet Education []Diet Education []Electrolyte Management []General Nutrition Management and Supplements []Management of Tube Feeding []TPN Recommendations []  RN Referral:  []MST score >=2 
   []Enteral/Parenteral Nutrition PTA []Pregnant: Gestational DM or Multigestation 
   []Pressure Ulcer/Wound Care needs 
     
[]  Low BMI [x]  LOS Starleen Home Pager [phone] Weekend Pager [phone]

## 2019-03-01 NOTE — PROGRESS NOTES
Problem: Mobility Impaired (Adult and Pediatric) Goal: *Acute Goals and Plan of Care (Insert Text) Physical Therapy Goals Initiated 2/22/2019. Reviewed on 3/1/2019 and goals remain appropriate 1. Patient will move from supine to sit and sit to supine , scoot up and down and roll side to side in bed with independence within 7 day(s). 2.  Patient will transfer from bed to chair and chair to bed with independence using the least restrictive device within 7 day(s). 3.  Patient will perform sit to stand with independence within 7 day(s). 4.  Patient will ambulate with modified independence for 150 feet with the least restrictive device within 7 day(s). 5.  Patient will ascend/descend 5 stairs with B handrail(s) with minimal assistance/contact guard assist within 7 day(s). physical Therapy TREATMENT: WEEKLY REASSESSMENT Patient: Carol Contreras (77 y.o. female) Date: 3/1/2019 Diagnosis: CHF (congestive heart failure) ([facility]) [I50.9] Pneumonia Precautions:   
Chart, physical therapy assessment, plan of care and goals were reviewed. ASSESSMENT: 
Patient received resting in chair on RA, cleared and agreeable to work with therapy. Patient performed transfers with SBA. Ambulated 20 feet to and from bathroom with RW with intermittent rest break on toilet. Transferred on and off toilet with SBA. Ambulated additional 100 feet with RW, demonstrating slowed pace, decreased step length and decreased step clearance bilaterally. Patient reports \"my chest is tired. \" While walking, patient's HR fluctuated with highest heart rate of 140 bpm. Patient started becoming fatigued at the end of ambulation, walking with a slower pace. Patient left resting in chair with VSS on RA and all needs met at conclusion of treatment session. Recommending HH with 24/7 supervision at discharge.   
 
Patient's progression toward goals since last assessment: Patient has improved upon ambulation distance, endurance and overall functional mobility. PLAN: 
Goals have been updated based on progression since last assessment. Patient continues to benefit from skilled intervention to address the above impairments. Continue to follow the patient 3 times a week to address goals. Planned Interventions: 
[x]              Bed Mobility Training             []       Neuromuscular Re-Education [x]              Transfer Training                   []       Orthotic/Prosthetic Training 
[x]              Gait Training                         []       Modalities [x]              Therapeutic Exercises           []       Edema Management/Control [x]              Therapeutic Activities            [x]       Patient and Family Training/Education []              Other (comment): 
Discharge Recommendations: Home Health with 24/7 supervision Further Equipment Recommendations for Discharge: unlikely as patient has reported that can borrow a rollator from a friend SUBJECTIVE:  
Patient stated I think I should go for a walk today.  OBJECTIVE DATA SUMMARY:  
Critical Behavior: 
Neurologic State: Alert, Appropriate for age, Eyes open spontaneously Orientation Level: Oriented X4 Cognition: Appropriate decision making, Follows commands Strength:  
Strength: Generally decreased, functional 
  
  
  
  
  
  
 
Functional Mobility Training: 
Bed Mobility: 
  
  
  
  
  
  
Transfers: 
Sit to Stand: Stand-by assistance Stand to Sit: Stand-by assistance Balance: 
Sitting: Intact Standing: Impaired Standing - Static: Good;Constant support Standing - Dynamic : FairAmbulation/Gait Training: 
Distance (ft): 100 Feet (ft) Assistive Device: Gait belt;Walker, rolling Ambulation - Level of Assistance: Stand-by assistance Gait Abnormalities: Decreased step clearance;Shuffling gait Base of Support: Narrowed Speed/Liv: Shuffled; Slow Step Length: Left shortened;Right shortened Barthel Index: 
 
Bathin Bladder: 10 Bowels: 10 
Groomin Dressin Feeding: 10 Mobility: 0 Stairs: 0 Toilet Use: 10 Transfer (Bed to Chair and Back): 10 Total: 65/100 The Barthel ADL Index: Guidelines 1. The index should be used as a record of what a patient does, not as a record of what a patient could do. 2. The main aim is to establish degree of independence from any help, physical or verbal, however minor and for whatever reason. 3. The need for supervision renders the patient not independent. 4. A patient's performance should be established using the best available evidence. Asking the patient, friends/relatives and nurses are the usual sources, but direct observation and common sense are also important. However direct testing is not needed. 5. Usually the patient's performance over the preceding 24-48 hours is important, but occasionally longer periods will be relevant. 6. Middle categories imply that the patient supplies over 50 per cent of the effort. 7. Use of aids to be independent is allowed. [facility] Barthel, D.W. (8834). Functional evaluation: the Barthel Index. [address] [facility] (14)2. Halie Sanchez, ERICK.F, Edin Lew., [employer] [facility], [address] [place] [address] (). Measuring the change indisability after inpatient rehabilitation; comparison of the responsiveness of the Barthel Index and Functional [employer] Measure. Journal of Neurology, Neurosurgery, and Psychiatry, 66(4), 750-841. Kal Klein, N.J.A, Chirag Denton  WYoungJ.LALO, & Denise Ybarra M.A. (2004.) Assessment of post-stroke quality of life in cost-effectiveness studies: The usefulness of the Barthel Index and the EuroQoL-5D. [facility], 13, 780-54 Pain: 
Pain Scale 1: Numeric (0 - 10) Pain Intensity 1: 0 Activity Tolerance:  
Fair with fluctuating HR (A-fib) on RA 
 
 Please refer to the flowsheet for vital signs taken during this treatment. After treatment:  
[x]  Patient left in no apparent distress sitting up in chair 
[]  Patient left in no apparent distress in bed 
[x]  Call bell left within reach [x]  Nursing notified 
[]  Caregiver present 
[]  Bed alarm activated COMMUNICATION/COLLABORATION:  
The patients plan of care was discussed with: Registered Nurse and  Fredrick Manzanares 
 Time Calculation: 27 mins Regarding student involvement in patient care: A student participated in this treatment session. Per CMS Medicare statements and APTA guidelines I certify that the following was true: 1. I was present and directly observed the entire session. 2. I made all skilled judgments and clinical decisions regarding care. 3. I am the practitioner responsible for assessment, treatment, and documentation.

## 2019-03-01 NOTE — PROGRESS NOTES
ADULT PROTOCOL: JET AEROSOL  REASSESSMENT Patient  Shoshana Carias     77 y.o.   female     3/1/2019  3:38 PM 
 
Breath Sounds Pre Procedure:  Clear Breath Sounds Post Procedure:  Clear Breathing pattern: Pre procedure Breathing Pattern: Regular Post procedure Breathing Pattern: Regular Heart Rate: Pre procedure Pulse: 105 Post procedure Pulse: 80 Resp Rate: Pre procedure Respirations: 18 Post procedure Respirations: 18 
 
     
 
Cough: Pre procedure Cough: Non-productive Post procedure Cough: Non-productive Oxygen: O2 Device: Room air SpO2: Pre procedure SpO2: 98 % Post procedure SpO2: 96 % Nebulizer Therapy: Current medications Aerosolized Medications: DuoNeb Changed: {YES To PRN Smoking History:  Never Problem List:  
Patient Active Problem List  
Diagnosis Code  CHF (congestive heart failure) ([facility]) I50.9  Pneumonia J18.9 Respiratory Therapist: RT Iván

## 2019-03-02 VITALS
SYSTOLIC BLOOD PRESSURE: 142 MMHG | DIASTOLIC BLOOD PRESSURE: 58 MMHG | HEIGHT: 68 IN | BODY MASS INDEX: 31.2 KG/M2 | RESPIRATION RATE: 18 BRPM | TEMPERATURE: 97.9 F | WEIGHT: 205.9 LBS | HEART RATE: 76 BPM | OXYGEN SATURATION: 98 %

## 2019-03-02 PROBLEM — I48.91 A-FIB (HCC): Status: ACTIVE | Noted: 2019-03-02

## 2019-03-02 LAB
ANION GAP SERPL CALC-SCNC: 8 MMOL/L (ref 5–15)
BUN SERPL-MCNC: 12 MG/DL (ref 6–20)
BUN/CREAT SERPL: 16 (ref 12–20)
CALCIUM SERPL-MCNC: 8.7 MG/DL (ref 8.5–10.1)
CHLORIDE SERPL-SCNC: 104 MMOL/L (ref 97–108)
CO2 SERPL-SCNC: 26 MMOL/L (ref 21–32)
CREAT SERPL-MCNC: 0.77 MG/DL (ref 0.55–1.02)
DATE LAST DOSE: ABNORMAL
GLUCOSE BLD STRIP.AUTO-MCNC: 153 MG/DL (ref 65–100)
GLUCOSE BLD STRIP.AUTO-MCNC: 219 MG/DL (ref 65–100)
GLUCOSE SERPL-MCNC: 169 MG/DL (ref 65–100)
POTASSIUM SERPL-SCNC: 3.9 MMOL/L (ref 3.5–5.1)
REPORTED DOSE,DOSE: ABNORMAL UNITS
REPORTED DOSE/TIME,TMG: ABNORMAL
SERVICE CMNT-IMP: ABNORMAL
SERVICE CMNT-IMP: ABNORMAL
SODIUM SERPL-SCNC: 138 MMOL/L (ref 136–145)
VANCOMYCIN TROUGH SERPL-MCNC: 10.6 UG/ML (ref 5–10)

## 2019-03-02 PROCEDURE — 74011250637 HC RX REV CODE- 250/637: Performed by: INTERNAL MEDICINE

## 2019-03-02 PROCEDURE — 36415 COLL VENOUS BLD VENIPUNCTURE: CPT

## 2019-03-02 PROCEDURE — 74011636637 HC RX REV CODE- 636/637: Performed by: INTERNAL MEDICINE

## 2019-03-02 PROCEDURE — 80048 BASIC METABOLIC PNL TOTAL CA: CPT

## 2019-03-02 PROCEDURE — 82962 GLUCOSE BLOOD TEST: CPT

## 2019-03-02 PROCEDURE — 74011250636 HC RX REV CODE- 250/636: Performed by: HOSPITALIST

## 2019-03-02 PROCEDURE — 80202 ASSAY OF VANCOMYCIN: CPT

## 2019-03-02 RX ORDER — PANTOPRAZOLE SODIUM 40 MG/1
40 TABLET, DELAYED RELEASE ORAL
Qty: 30 TAB | Refills: 0 | Status: SHIPPED | OUTPATIENT
Start: 2019-03-03

## 2019-03-02 RX ORDER — DILTIAZEM HYDROCHLORIDE 120 MG/1
120 CAPSULE, COATED, EXTENDED RELEASE ORAL DAILY
Qty: 30 CAP | Refills: 0 | Status: SHIPPED | OUTPATIENT
Start: 2019-03-03

## 2019-03-02 RX ORDER — METOPROLOL TARTRATE 100 MG/1
100 TABLET ORAL 2 TIMES DAILY
Qty: 60 TAB | Refills: 0 | Status: SHIPPED | OUTPATIENT
Start: 2019-03-02

## 2019-03-02 RX ADMIN — METOPROLOL TARTRATE 100 MG: 50 TABLET ORAL at 09:31

## 2019-03-02 RX ADMIN — DILTIAZEM HYDROCHLORIDE 120 MG: 120 CAPSULE, COATED, EXTENDED RELEASE ORAL at 09:30

## 2019-03-02 RX ADMIN — PANTOPRAZOLE SODIUM 40 MG: 40 TABLET, DELAYED RELEASE ORAL at 08:08

## 2019-03-02 RX ADMIN — LOSARTAN POTASSIUM 100 MG: 100 TABLET, FILM COATED ORAL at 09:31

## 2019-03-02 RX ADMIN — VANCOMYCIN HYDROCHLORIDE 1000 MG: 1 INJECTION, POWDER, LYOPHILIZED, FOR SOLUTION INTRAVENOUS at 07:31

## 2019-03-02 RX ADMIN — GUAIFENESIN 600 MG: 600 TABLET, EXTENDED RELEASE ORAL at 09:31

## 2019-03-02 RX ADMIN — ASPIRIN 81 MG: 81 TABLET, COATED ORAL at 09:31

## 2019-03-02 RX ADMIN — INSULIN LISPRO 2 UNITS: 100 INJECTION, SOLUTION INTRAVENOUS; SUBCUTANEOUS at 08:09

## 2019-03-02 RX ADMIN — FUROSEMIDE 40 MG: 40 TABLET ORAL at 09:31

## 2019-03-02 RX ADMIN — INSULIN LISPRO 2 UNITS: 100 INJECTION, SOLUTION INTRAVENOUS; SUBCUTANEOUS at 12:05

## 2019-03-02 RX ADMIN — LEVOTHYROXINE SODIUM 125 MCG: 125 TABLET ORAL at 08:08

## 2019-03-02 NOTE — DISCHARGE SUMMARY
Hospitalist Discharge Summary     Patient ID:  Nataliia Simmons  [id number]  77 y.o.  1940    PCP on record: Unknown, Provider    Admit date: 2/22/2019  Discharge date and time: 3/2/2019      DISCHARGE DIAGNOSIS:  Acute hypoxic respiratory failure  Pneumonia  Atrial Fibrillation  Staph coag negative bacteremia contaminant  HTN  DM  Hypothyroidism    CONSULTATIONS:  IP CONSULT TO CARDIOLOGY    Excerpted HPI from H&P of Philip Gómez MD:      Pt is 77years old female from home with past medical history significant for DM, hypertension, hypothyroidism, atrial fibrillation presented to the hospital complaining from worsening shortness of breath started about 2 weeks ago associated with a productive cough with brownish colored sputum associated with worsening bilateral leg swelling, patient denied any fever but stated that she felt chills, denied any chest pain CT chest was done on show bilateral pneumonia and reactive mediastinal lymphadenopathy and  mild interstitial edema    ______________________________________________________________________  DISCHARGE SUMMARY/HOSPITAL COURSE:  for full details see H&P, daily progress notes, labs, consult notes. .      Acute hypoxic respiratory failure secondary to community-acquired pneumonia  Patient was admitted to the hospital. Started her on azithromycin and rocephin. Her condition improved slowly and now back to baseline. Already finished antibiotics. .    AF with RVR   HR was high, started the pt on cardizem gtt. Hear rate improved. Appreciate cardiology input. Recommended to stop cardizem gtt. Cont. Metoprolol, Cardiology recommended not to start anti-coagulation due to risk of bleed from fall. .    Acute pulmonary edema  Echo showed nl EF and diastolic function except moderate to severe mitral valve regurgitation. Treated with IV alsix, Repeat CXR resolved. .   Staph coag bacteremia  Most likely contamination.  Pt placed on Vancomycin. Repeat Cx is negative, stop Abx. .   DM  Cont SSI w POCs. Restart metformin and glimeperide at Dc. Eneida Hogan Hypertension  Continue home  Cozaar     . Hypothyroidism  Continue Synthyroid     . Hyperlipidemia   Continue simvastatin     _______________________________________________________________________  Patient seen and examined by me on discharge day. Pertinent Findings:  Gen:    Not in distress  Chest: Clear lungs  CVS:   IRIR. No edema  Abd:  Soft, not distended, not tender  _______________________________________________________________________  DISCHARGE MEDICATIONS:   Current Discharge Medication List      START taking these medications    Details   pantoprazole (PROTONIX) 40 mg tablet Take 1 Tab by mouth Daily (before breakfast). Qty: 30 Tab, Refills: 0      dilTIAZem CD (CARDIZEM CD) 120 mg ER capsule Take 1 Cap by mouth daily. Qty: 30 Cap, Refills: 0         CONTINUE these medications which have CHANGED    Details   metoprolol tartrate (LOPRESSOR) 100 mg IR tablet Take 1 Tab by mouth two (2) times a day. Qty: 60 Tab, Refills: 0         CONTINUE these medications which have NOT CHANGED    Details   furosemide (LASIX) 40 mg tablet Take 40 mg by mouth daily. pravastatin (PRAVACHOL) 80 mg tablet Take 80 mg by mouth nightly. cholecalciferol (VITAMIN D3) 2,000 unit cap capsule Take 2,000 Units by mouth daily. aspirin (ASPIRIN) 325 mg tablet Take 325 mg by mouth daily. calcium-cholecalciferol, D3, (CALTRATE 600+D) tablet Take 1 Tab by mouth two (2) times a day. peg 400-propylene glycol (SYSTANE, PROPYLENE GLYCOL,) 0.4-0.3 % drop Administer 1 Drop to both eyes two (2) times daily as needed (dry eyes). metFORMIN (GLUCOPHAGE) 500 mg tablet Take 500 mg by mouth two (2) times daily (with meals). levothyroxine (SYNTHROID) 125 mcg tablet Take 125 mcg by mouth Daily (before breakfast). losartan (COZAAR) 100 mg tablet Take 100 mg by mouth daily. glimepiride (AMARYL) 4 mg tablet Take 4-8 mg by mouth nightly as needed. BS >200         STOP taking these medications       cloNIDine HCl (CATAPRES) 0.1 mg tablet Comments:   Reason for Stopping:         amLODIPine (NORVASC) 5 mg tablet Comments:   Reason for Stopping:         guanFACINE 2 mg Tab Comments:   Reason for Stopping:         simvastatin (ZOCOR) 80 mg tablet Comments:   Reason for Stopping:         HYDROcodone-acetaminophen (LORTAB) 2.5-500 mg per tablet Comments:   Reason for Stopping:         aspirin 81 mg CpDR Comments:   Reason for Stopping:         omeprazole (PRILOSEC) 20 mg capsule Comments:   Reason for Stopping:               My Recommended Diet, Activity, Wound Care, and follow-up labs are listed in the patient's Discharge Insturctions which I have personally completed and reviewed. ______________________________________________________________________    Risk of deterioration: High    Condition at Discharge:  Stable  ______________________________________________________________________    Disposition  Home with family and home health services  ______________________________________________________________________    Care Plan discussed with:   Patient, RN    ______________________________________________________________________    Code Status: Full Code  ______________________________________________________________________      Follow up with:   PCP : Unknown, Provider  Follow-up Information     Follow up With Specialties Details Why Contact Info    [address]  Today [address] IF YOU DO NOT HEAR FROM THEM WITHIN 24-48HRS, PLEASE CONTACT THEM WITHIN 24-48HRS.  [address] Martha Rivera MD Family Practice Go on 2/28/2019 9:30 am [address] [address] MD [facility] On 3/12/2019 @ 2:30 PM [address] [facility] [place] [address] [address]  [phone]                Total time in minutes spent coordinating this discharge (includes going over instructions, follow-up, prescriptions, and preparing report for sign off to her PCP) :   More than 30 minutes    Signed:  Jewel Mane MD

## 2019-03-02 NOTE — PROGRESS NOTES
7:15 AM Report received from [facility], [facility]. 
 
9:30 AM Patient refused lovenox this AM d/t \"all of this bruising. \" Patient states she will take her 325 ASA on discharge. Patient also refusing miralax d/t 2 BMs yesterday and 2nd BM soft. 11:50 AM Patient ambulated in hallway with standby assist and tolerated well. Patient's HR reached 113 bpm and O2 sats remained 93-94% on room air. 4:50 PM Reviewed discharge instructions with patient and son, Amanda Hardy. Patient and son verbalized understanding of all instructions. Prescriptions provided and son states he will take patient to pharmacy now to have them filled. Patient and son acknowledge the change in medication regimen. Patient states she will stop amlodipine, clonidine and prilosec. Patient acknowledges that the dose of her metoprolol has increased. Heart failure and afib teaching provided. Patient and son verbalize understanding. PIV removed, tip intact and Band-Aid applied. All belongings with patient at discharge. Patient escorted to private vehicle via wheelchair. Patient's son to drive her to her residence.

## 2019-03-02 NOTE — DISCHARGE INSTRUCTIONS
Discharge Instructions       PATIENT ID: Elvia Still  MRN: [id number]   YOB: 1940    DATE OF ADMISSION: 2/22/2019 12:37 AM    DATE OF DISCHARGE: 3/2/2019    PRIMARY CARE PROVIDER: Unknown, Provider     ATTENDING PHYSICIAN: Royer Schreiber MD  DISCHARGING PROVIDER: Govind Raman MD    To contact this individual call [phone] and ask the  to page. If unavailable ask to be transferred the Adult Hospitalist Department. DISCHARGE DIAGNOSES   AFib  Pneumonia    CONSULTATIONS: IP CONSULT TO CARDIOLOGY    PROCEDURES/SURGERIES: * No surgery found *    PENDING TEST RESULTS:   At the time of discharge the following test results are still pending: No    FOLLOW UP APPOINTMENTS:   Follow-up Information     Follow up With Specialties Details Why Contact Info    [address]  Today THIS  [address] IF YOU DO NOT HEAR FROM THEM WITHIN 24-48HRS, PLEASE CONTACT THEM WITHIN 24-48HRS. [address] Martha Rivera MD Family Practice Go on 2/28/2019 9:30 am [address] [address]      Charbel Deng MD Cardiology On 3/12/2019 @ 2:30 PM [address]  [place] [address] [address]  [phone]             ADDITIONAL CARE RECOMMENDATIONS: No    DIET: Cardiac Diet    ACTIVITY: Activity as tolerated    DISCHARGE MEDICATIONS:   See Medication Reconciliation Form    · It is important that you take the medication exactly as they are prescribed. · Keep your medication in the bottles provided by the pharmacist and keep a list of the medication names, dosages, and times to be taken in your wallet. · Do not take other medications without consulting your doctor. NOTIFY YOUR PHYSICIAN FOR ANY OF THE FOLLOWING:   Fever over 101 degrees for 24 hours. Chest pain, shortness of breath, fever, chills, nausea, vomiting, diarrhea, change in mentation, falling, weakness, bleeding.  Severe pain or pain not relieved by medications. Or, any other signs or symptoms that you may have questions about.       DISPOSITION:    Home With:   OT  PT  HH  RN       SNF/Inpatient Rehab/LTAC    Independent/assisted living    Hospice    Other:     CDMP Checked:   Yes y     PROBLEM LIST Updated:  Yes y       Signed:   Govind Raman MD  3/2/2019  9:41 AM

## 2019-03-02 NOTE — PROGRESS NOTES
Cardiology Progress Note 3/2/2019     Admit Date: 2/22/2019 Admit Diagnosis: CHF (congestive heart failure) ([facility]) [I50.9]  CC: none currently Assessment (as per Dr Massimo Robin): Admitted with pneumonia -  Primary diagnosis. Mild pulmonary edema associated. Chronic Atrial Fibrillation . peripheral edema. Diabetes HTN Hyperlipidemia. Elevated TSH   ? Hypothyroid.  
  
  
Echo:  
· Estimated left ventricular ejection fraction is 51 - 55%. Left ventricular mild concentric hypertrophy. · Left atrial cavity size is moderately dilated. · Right atrial cavity size is severely dilated. · Mild aortic valve leaflet calcification present. Mild aortic valve stenosis is present. Mild aortic valve regurgitation is present. · Mitral valve thickening. Moderate to severe mitral valve regurgitation. · Moderate tricuspid valve regurgitation is present. 
  
2/23   OOB in chair. Says she is feeling better today. Lungs still have significant diffuse wheezing. Mild edema. As noted , echo shows normal LV function.  
  
2/25 Feeling better. Lungs are clear to auscultation. Ankles still have some edema OOB in chair , not on O2.  
  
2/26   HR and BP up today . Likely due to withdrawal of B Blocker. Resume Metoprolol . She had not been on anticoagulation as OP . Has been on ASA per med list.  I don't think a NOAC is going to be a good option for her. Risk of falls. Cost.  
  
2/27  Hemodynamics stable. Pulse is better. Will need to titrate her meds. She was on 150 mg/ day on Metoprolol,  Clonidine and Amlodipine PTA. Say may need further med for BP control as she moves along.  
  
2/28  She feels well. Breathing better . Up with walker. Sitting on the bench in the room this AM . HR still gets up some with activity . BP could also tolerate some adjustment.   
However , if she is ready to discharge , she would be OK from heart standpoint . HR would not keep her in hospital.  
  
Blood culture noted. Quite possible contaminate. Hospital team to manage.  
  
         Plan:   
 
 Continue  Metoprolol ; Will add oral Diltiazem to help with P/ BP .  
  
3/2: Chronic afib well controlled; No vol overload. OK for d/c from cardiac standpoint. For other plans, see orders. Hospital problem list  
Active Hospital Problems Diagnosis Date Noted  A-fib ([facility] 2019  CHF (congestive heart failure) ([facility] 2019  Pneumonia 2019 Subjective: Dinora Fernández reports Chest pain X none  consistent with:  Non-cardiac CP Atypical CP None now  On going  Anginal CP Dyspnea X none  at rest  with exertion    
    improved  unchanged  worse PND X none  overnight Orthopnea X none  improved  unchanged  worse Presyncope X none  improved  unchanged  worse Ambulated in hallway without symptoms   Yes Ambulated in room without symptoms  Yes Objective:  
 Physical Exam: 
Overall VSSAF;   
Visit Vitals /58 (BP 1 Location: Left arm, BP Patient Position: At rest;Sitting) Pulse 76 Temp 97.9 °F (36.6 °C) Resp 18 Ht 5' 8\" (1.727 m) Wt 93.4 kg (205 lb 14.4 oz) SpO2 98% BMI 31.31 kg/m² Temp (24hrs), Av.1 °F (36.7 °C), Min:97.3 °F (36.3 °C), Max:98.6 °F (37 °C) Patient Vitals for the past 8 hrs: 
 Pulse 19 1103 76  
19 0930 75  
19 0717 67 Patient Vitals for the past 8 hrs: 
 Resp  
19 1103 18  
19 0717 18 Patient Vitals for the past 8 hrs: 
 BP  
19 1103 142/58  
19 0930 129/64  
19 0717 171/83 Intake/Output Summary (Last 24 hours) at 3/2/2019 1207 Last data filed at 3/2/2019 7217 Gross per 24 hour Intake 370 ml Output  Net 370 ml General Appearance: Well developed, well nourished, no acute distress. Ears/Nose/Mouth/Throat:   Normal MM; anicteric. JVP: WNL Resp:   clear to auscultation bilaterally. Nl resp effort. Cardiovascular:  IRRR, S1, S2 normal, no new murmur. No gallop or rub. Abdomen:   Soft, non-tender, bowel sounds are present. Extremities: trivial edema bilaterally. Skin: 
Neuro: Warm and dry. A/O x3, grossly nonfocal  
cath site intact w/o hematoma or new bruit; distal pulse unchanged  Yes Data Review:    
Telemetry independently reviewed  sinus x chronic afib  parox afib  NSVT  
 
ECG independently reviewed  NSR  afib  no significant changes  NSST-Tw chgs  
x no new ECG provided for review Lab results reviewed as noted below. Current medications reviewed as noted below. No results for input(s): PH, PCO2, PO2 in the last 72 hours. No results for input(s): CPK, CKMB, CKNDX, TROIQ in the last 72 hours. Recent Labs 03/02/19 
3610 03/01/19 
6440 02/28/19 
2184  138 138  
K 3.9 3.8 3.8  103 102 CO2 26 27 24 BUN 12 13 13 CREA 0.77 0.69 0.78 GFRAA >60 >60 >60  
* 167* 161* CA 8.7 8.1* 8.6 WBC  --   --  6.6 HGB  --   --  11.5 HCT  --   --  35.8 PLT  --   --  297 No results found for: CHOL, CHOLX, CHLST, CHOLV, HDL, LDL, LDLC, DLDLP, TGLX, TRIGL, TRIGP, CHHD, CHHDX No results for input(s): SGOT, GPT, AP, TBIL, TP, ALB, GLOB, GGT, AML, LPSE in the last 72 hours. No lab exists for component: AMYP, HLPSE No results for input(s): INR, PTP, APTT in the last 72 hours. No lab exists for component: INREXT No components found for: [place] Current Facility-Administered Medications Medication Dose Route Frequency  dilTIAZem CD (CARDIZEM CD) capsule 120 mg  120 mg Oral DAILY  albuterol-ipratropium (DUO-NEB) 2.5 MG-0.5 MG/3 ML  3 mL Nebulization Q6H PRN  
 metoprolol tartrate (LOPRESSOR) tablet 100 mg  100 mg Oral BID  furosemide (LASIX) tablet 40 mg  40 mg Oral DAILY  docusate sodium (COLACE) capsule 100 mg  100 mg Oral BID PRN  
  polyethylene glycol (MIRALAX) packet 17 g  17 g Oral DAILY  glucose chewable tablet 16 g  4 Tab Oral PRN  
 enoxaparin (LOVENOX) injection 90 mg  1 mg/kg SubCUTAneous Q12H  
 guaiFENesin ER (MUCINEX) tablet 600 mg  600 mg Oral BID  
 glucose chewable tablet 16 g  4 Tab Oral PRN  
 dextrose (D50W) injection syrg 12.5-25 g  25-50 mL IntraVENous PRN  
 glucagon (GLUCAGEN) injection 1 mg  1 mg IntraMUSCular PRN  
 insulin lispro (HUMALOG) injection   SubCUTAneous AC&HS  atorvastatin (LIPITOR) tablet 40 mg  40 mg Oral EVERY OTHER DAY  losartan (COZAAR) tablet 100 mg  100 mg Oral DAILY  guanFACINE IR (TENEX) tablet 2 mg  2 mg Oral QHS  
 HYDROcodone-acetaminophen (NORCO) 5-325 mg per tablet 0.5 Tab  0.5 Tab Oral Q6H PRN  
 aspirin delayed-release tablet 81 mg  81 mg Oral DAILY  pantoprazole (PROTONIX) tablet 40 mg  40 mg Oral ACB  levothyroxine (SYNTHROID) tablet 125 mcg  125 mcg Oral ACB  sodium chloride (NS) flush 5-40 mL  5-40 mL IntraVENous Q8H  
 sodium chloride (NS) flush 5-40 mL  5-40 mL IntraVENous PRN  
 acetaminophen (TYLENOL) tablet 650 mg  650 mg Oral Q6H PRN  
 ondansetron (ZOFRAN) injection 4 mg  4 mg IntraVENous Q8H PRN  
 bisacodyl (DULCOLAX) tablet 5 mg  5 mg Oral DAILY PRN Uri Carrasquillo MD

## 2019-03-05 LAB
BACTERIA SPEC CULT: NORMAL
SERVICE CMNT-IMP: NORMAL

## 2019-03-06 ENCOUNTER — PATIENT OUTREACH (OUTPATIENT)
Dept: CASE MANAGEMENT | Age: 79
End: 2019-03-06

## 2019-03-06 NOTE — PROGRESS NOTES
Hospital Discharge Follow-Up      Date/Time:  3/6/2019 11:05 AM    Patient was admitted to [facility] on 19 and discharged on 3/2/19 for Pneumonia/ pulmonary edema. The physician discharge summary was available at the time of outreach. Patient was contacted within 3 business days of discharge. Top Challenges reviewed with the provider   -patient reports concern that scale received from the hospital is not accurate- given erratic readings- NN spoke with [facility] [place] [facility] [address] [address] John will take patient a new scale during her visit tomorrow. - Not communicated to provider as this is being corrected. - patient understands importance of accurate weight readings and reporting any gain of 2 lbs or more per day to her physician               Method of communication with provider :none    Inpatient RRAT score: 13  Was this a readmission? no   Patient stated reason for the readmission: na    Nurse Navigator (NN) contacted the patient by telephone to perform post hospital discharge assessment. Verified name and  with patient as identifiers. Provided introduction to self, and explanation of the Nurse Navigator role. Reviewed discharge instructions and red flags with patient who verbalized understanding. Patient given an opportunity to ask questions and does not have any further questions or concerns at this time. The patient agrees to contact the PCP office for questions related to their healthcare. NN provided contact information for future reference. Disease Specific:   N/A    Summary of patient's top problems:  1. Pneumonia- Treated for CAP with Iv Rocephin and Azithromycin courses. Patient returned to baseline prior to d/c. Does not need 02 at d/c. On admission had increasing SOB and cough productive of brown sputum. Patient reports today some cough which has been improving and is productive at times- no further dark colored sputum noted. Now white in color.  Patient notes no more SOB noted with exertion and does not feel SOB at rest. No fevers reported. Patient reports she feels she is \"feel fine, improving all the time\". 2. Acute Pulmonary edema- Echo showed normal LV function and EF with moderate to severe mitral regurg. Treated with IV Lasix. Upon repeat CXR this had resolved. Patient reports prior to admission bending down to tie her shoes she felt that she had to sit up right away due to feeling SOB. She is bending and moving now without difficulty and does not feel this SOB/ pressure with bending down. She doesn't note any lower ext edema which she had upon admission. She is concerned that scale she received from the hospital is inaccurate. She weighs in the am as soon as she wakes and empties her bladder. Her weights on this scale are erratic and she isnt swelling or feeling SOB and feels that they are erroneous. Weights were on 3/3- 205 lbs, 3/4 186 lbs, 3/5 195 lbs, and 3/6 198 lbs.  [facility] a new scale tomorrow and this will be done. Patient understands importance of monitoring for fluid gain sx's / weight gain and when to contact the MD. She is following a low NA diet. She is also monitoring BP's daily and notes that bp yesterday 130/76. Her BPs seem higher in the morning and she has decided to start taking her Cozaar at night as she used to to help manage BP overnight more. She is sleeping only on 1 pillow and was able to get through the night with out coughing last night and slept well. 3. AFIB- Was on Cardizem gtt as inpatient. Now on PO Metoprolol BID and ASA. Cardiology recommended no anticoagulation due to unsteady gait/ risk to fall. Patient reports no palpitations felt and reports she is unable to distinguish when she is in AFIB. Has cardiology follow up on 3/12 Dr Nasrin Velazco. 4. Mobility- Patient reports walks with a walker even prior to admission.  Admits to history of unsteady gait- no recent falls- will be seen by [facility] OT/PT and per patient request NN requested that OT bring information/ resources to patient regarding installing accessible shower and safety bars at home - they will provide this tomorrow at her home visit. Home Health orders at discharge: PT, OT, SN, none  [address]: [facility]   Date of initial visit: 3/5/19 seen by nurse - 3/7 will be seen by nurse and OT    Durable Medical Equipment ordered/company: n/a  Durable Medical Equipment received: n/a    Barriers to care? none- patient does not drive independently - family provides transportation- patient is independent in ADLs and daughter is staying with her this week and son lives locally - she notes she often has someone in with /checking in on her. Advance Care Planning:   Does patient have an Advance Directive:  not on file - Chart states ACP on file- upon review it is NOT on file- patient does have AMD and states her son plans to take a copy to the hospital to be scanned into her EMR. Medication(s):   New Medications at Discharge: Protonix, Cardizem   Changed Medications at Discharge: Metoprolol  Discontinued Medications at Discharge: Catapres, Norvasc, Guanfacine, Zocor, Lortab, ASA , Omeprazole    Medication reconciliation was performed with patient, who verbalizes understanding of administration of home medications. There were no barriers to obtaining medications identified at this time. Referral to Pharm D needed: no     Current Outpatient Medications   Medication Sig    metoprolol tartrate (LOPRESSOR) 100 mg IR tablet Take 1 Tab by mouth two (2) times a day.  pantoprazole (PROTONIX) 40 mg tablet Take 1 Tab by mouth Daily (before breakfast).  dilTIAZem CD (CARDIZEM CD) 120 mg ER capsule Take 1 Cap by mouth daily.  furosemide (LASIX) 40 mg tablet Take 40 mg by mouth daily.  pravastatin (PRAVACHOL) 80 mg tablet Take 80 mg by mouth nightly.     cholecalciferol (VITAMIN D3) 2,000 unit cap capsule Take 2,000 Units by mouth daily.    aspirin (ASPIRIN) 325 mg tablet Take 325 mg by mouth daily.  calcium-cholecalciferol, D3, (CALTRATE 600+D) tablet Take 1 Tab by mouth two (2) times a day.  peg 400-propylene glycol (SYSTANE, PROPYLENE GLYCOL,) 0.4-0.3 % drop Administer 1 Drop to both eyes two (2) times daily as needed (dry eyes).  metFORMIN (GLUCOPHAGE) 500 mg tablet Take 500 mg by mouth two (2) times daily (with meals).  levothyroxine (SYNTHROID) 125 mcg tablet Take 125 mcg by mouth Daily (before breakfast).  losartan (COZAAR) 100 mg tablet Take 100 mg by mouth daily.  glimepiride (AMARYL) 4 mg tablet Take 4-8 mg by mouth nightly as needed. BS >200     No current facility-administered medications for this visit. There are no discontinued medications. [facility] follow up appointment(s): No future appointments. Non-[facility] follow up appointment(s): Patient has appt with PCP Dr Som Adhikari on 3/8/19 and with cardiology Dr Ruben Joseph on 3/12/19- - these appts not within 48 hr window from time of d/c as her PCP didn't have anything any sooner , NN  d/w patient the option of being seen by [facility] prior to PCP visit - patient is interested in this and NN will call to see if patient in service area - lives in [facility] - spoke with Juli Gupta it is outside of service area. Patient notified to plan for PCP follow up on Friday and all MD with any concerns prior to visit.      [facility]:  out of service area

## 2019-03-14 ENCOUNTER — PATIENT OUTREACH (OUTPATIENT)
Dept: CASE MANAGEMENT | Age: 79
End: 2019-03-14

## 2019-03-15 ENCOUNTER — PATIENT OUTREACH (OUTPATIENT)
Dept: CASE MANAGEMENT | Age: 79
End: 2019-03-15

## 2019-03-18 ENCOUNTER — PATIENT OUTREACH (OUTPATIENT)
Dept: CASE MANAGEMENT | Age: 79
End: 2019-03-18

## 2019-03-18 NOTE — PROGRESS NOTES
NN contacted patient for follow up to verify post hospital appts were attended. Patient verified identity using 2 patient identifiers. Patient reports she is doing well she is not having any cough or SOB. Patients only complaint is of abdominal pain since discharge and poor appetite. She is not having any nausea/ vomiting. She drank some prune juice yesterday and has had BM's with no diarrhea. NN suggested patient check with cardiologist to see if he feels her sx's could be side effects of Cardizem as these are listed side effects and she noted these develop after discharge and this was the only new med rx/d. She will call his office to discuss and if there is no resolution she will schedule with pcp for follow up for this. She feels \"it has gotten a little better\" and recalls having the same for about 3 months in the past post a hospital d/c . She isnt sure whether it was due to ABX she was receiving or diet changes or what. She has weighed daily and for the last 3 days weight has been 199.2 which is about a 6 lb total loss since discharge. She is being followed by [place] who is tracking her weights as well. She has seen PCP and cardiology Dr Massimo Robin for post hospital follow up., She has no other questions or concerns. NN will now close this episode. Patient has graduated from the Transitions of Care Coordination  program on 3/18/19. Patient's symptoms are stable at this time. Patient/family has the ability to self-manage. Care management goals have been completed at this time. No further nurse navigator follow up scheduled. Pt has nurse navigator's contact information for any further questions, concerns, or needs. Patients upcoming visits:  No future appointments.

## 2019-03-31 ENCOUNTER — APPOINTMENT (OUTPATIENT)
Dept: GENERAL RADIOLOGY | Age: 79
End: 2019-03-31
Attending: EMERGENCY MEDICINE
Payer: MEDICARE

## 2019-03-31 ENCOUNTER — HOSPITAL ENCOUNTER (EMERGENCY)
Age: 79
Discharge: HOME | End: 2019-03-31
Attending: EMERGENCY MEDICINE
Payer: MEDICARE

## 2019-03-31 VITALS
WEIGHT: 200.4 LBS | HEART RATE: 83 BPM | SYSTOLIC BLOOD PRESSURE: 171 MMHG | BODY MASS INDEX: 30.37 KG/M2 | RESPIRATION RATE: 16 BRPM | HEIGHT: 68 IN | DIASTOLIC BLOOD PRESSURE: 91 MMHG | TEMPERATURE: 97.9 F | OXYGEN SATURATION: 97 %

## 2019-03-31 DIAGNOSIS — J81.0 ACUTE PULMONARY EDEMA (HCC): ICD-10-CM

## 2019-03-31 DIAGNOSIS — I50.9 CONGESTIVE HEART FAILURE, UNSPECIFIED HF CHRONICITY, UNSPECIFIED HEART FAILURE TYPE (HCC): Primary | ICD-10-CM

## 2019-03-31 DIAGNOSIS — E87.6 HYPOKALEMIA: ICD-10-CM

## 2019-03-31 LAB
ALBUMIN SERPL-MCNC: 3.8 G/DL (ref 3.5–5)
ALBUMIN/GLOB SERPL: 0.8 {RATIO} (ref 1.1–2.2)
ALP SERPL-CCNC: 77 U/L (ref 45–117)
ALT SERPL-CCNC: 19 U/L (ref 12–78)
ANION GAP SERPL CALC-SCNC: 8 MMOL/L (ref 5–15)
AST SERPL-CCNC: 26 U/L (ref 15–37)
BILIRUB SERPL-MCNC: 1.1 MG/DL (ref 0.2–1)
BNP SERPL-MCNC: 529 PG/ML
BUN SERPL-MCNC: 8 MG/DL (ref 6–20)
BUN/CREAT SERPL: 10 (ref 12–20)
CALCIUM SERPL-MCNC: 8.5 MG/DL (ref 8.5–10.1)
CHLORIDE SERPL-SCNC: 101 MMOL/L (ref 97–108)
CK SERPL-CCNC: 142 U/L (ref 26–192)
CO2 SERPL-SCNC: 29 MMOL/L (ref 21–32)
COMMENT, HOLDF: NORMAL
CREAT SERPL-MCNC: 0.77 MG/DL (ref 0.55–1.02)
ERYTHROCYTE [DISTWIDTH] IN BLOOD BY AUTOMATED COUNT: 13.6 % (ref 11.5–14.5)
GLOBULIN SER CALC-MCNC: 4.6 G/DL (ref 2–4)
GLUCOSE SERPL-MCNC: 116 MG/DL (ref 65–100)
HCT VFR BLD AUTO: 37.6 % (ref 35–47)
HGB BLD-MCNC: 12.6 G/DL (ref 11.5–16)
MCH RBC QN AUTO: 30.6 PG (ref 26–34)
MCHC RBC AUTO-ENTMCNC: 33.5 G/DL (ref 30–36.5)
MCV RBC AUTO: 91.3 FL (ref 80–99)
NRBC # BLD: 0 K/UL (ref 0–0.01)
NRBC BLD-RTO: 0 PER 100 WBC
PLATELET # BLD AUTO: 217 K/UL (ref 150–400)
PMV BLD AUTO: 9.2 FL (ref 8.9–12.9)
POTASSIUM SERPL-SCNC: 3 MMOL/L (ref 3.5–5.1)
PROT SERPL-MCNC: 8.4 G/DL (ref 6.4–8.2)
RBC # BLD AUTO: 4.12 M/UL (ref 3.8–5.2)
SAMPLES BEING HELD,HOLD: NORMAL
SODIUM SERPL-SCNC: 138 MMOL/L (ref 136–145)
TROPONIN I SERPL-MCNC: <0.05 NG/ML
WBC # BLD AUTO: 5.3 K/UL (ref 3.6–11)

## 2019-03-31 PROCEDURE — 80053 COMPREHEN METABOLIC PANEL: CPT

## 2019-03-31 PROCEDURE — 85027 COMPLETE CBC AUTOMATED: CPT

## 2019-03-31 PROCEDURE — 82550 ASSAY OF CK (CPK): CPT

## 2019-03-31 PROCEDURE — 74011250637 HC RX REV CODE- 250/637: Performed by: EMERGENCY MEDICINE

## 2019-03-31 PROCEDURE — 84484 ASSAY OF TROPONIN QUANT: CPT

## 2019-03-31 PROCEDURE — 74011250636 HC RX REV CODE- 250/636: Performed by: EMERGENCY MEDICINE

## 2019-03-31 PROCEDURE — 71045 X-RAY EXAM CHEST 1 VIEW: CPT

## 2019-03-31 PROCEDURE — 83880 ASSAY OF NATRIURETIC PEPTIDE: CPT

## 2019-03-31 PROCEDURE — 96374 THER/PROPH/DIAG INJ IV PUSH: CPT

## 2019-03-31 PROCEDURE — 93005 ELECTROCARDIOGRAM TRACING: CPT

## 2019-03-31 PROCEDURE — 96375 TX/PRO/DX INJ NEW DRUG ADDON: CPT

## 2019-03-31 PROCEDURE — 36415 COLL VENOUS BLD VENIPUNCTURE: CPT

## 2019-03-31 PROCEDURE — 99285 EMERGENCY DEPT VISIT HI MDM: CPT

## 2019-03-31 RX ORDER — POTASSIUM CHLORIDE 750 MG/1
10 TABLET, FILM COATED, EXTENDED RELEASE ORAL DAILY
Qty: 30 TAB | Refills: 0 | Status: SHIPPED | OUTPATIENT
Start: 2019-03-31

## 2019-03-31 RX ORDER — POTASSIUM CHLORIDE 750 MG/1
40 TABLET, FILM COATED, EXTENDED RELEASE ORAL
Status: COMPLETED | OUTPATIENT
Start: 2019-03-31 | End: 2019-03-31

## 2019-03-31 RX ORDER — FUROSEMIDE 10 MG/ML
40 INJECTION INTRAMUSCULAR; INTRAVENOUS
Status: COMPLETED | OUTPATIENT
Start: 2019-03-31 | End: 2019-03-31

## 2019-03-31 RX ORDER — LABETALOL HCL 20 MG/4 ML
20 SYRINGE (ML) INTRAVENOUS ONCE
Status: COMPLETED | OUTPATIENT
Start: 2019-03-31 | End: 2019-03-31

## 2019-03-31 RX ADMIN — FUROSEMIDE 40 MG: 10 INJECTION, SOLUTION INTRAMUSCULAR; INTRAVENOUS at 20:19

## 2019-03-31 RX ADMIN — POTASSIUM CHLORIDE 40 MEQ: 750 TABLET, EXTENDED RELEASE ORAL at 21:49

## 2019-03-31 RX ADMIN — LABETALOL HYDROCHLORIDE 20 MG: 5 INJECTION, SOLUTION INTRAVENOUS at 21:49

## 2019-04-01 LAB
ATRIAL RATE: 65 BPM
CALCULATED R AXIS, ECG10: -15 DEGREES
CALCULATED T AXIS, ECG11: -16 DEGREES
DIAGNOSIS, 93000: NORMAL
Q-T INTERVAL, ECG07: 382 MS
QRS DURATION, ECG06: 98 MS
QTC CALCULATION (BEZET), ECG08: 454 MS
VENTRICULAR RATE, ECG03: 85 BPM

## 2019-04-01 NOTE — ED PROVIDER NOTES
EMERGENCY DEPARTMENT HISTORY AND PHYSICAL EXAM 
 
 
Date: 3/31/2019 Patient Name: Dory Pendleton History of Presenting Illness Chief Complaint Patient presents with  Weight Gain  
  reports recent dx of CHF with fluctuations in weight  Hypertension  
  also notes elevated BP x3 days History Provided By: Patient and Patient's Daughter HPI: Dory Pendleton, 77 y.o. female with PMHx significant for CHF, chronic atrial fibrillation, diabetes, and lower extremity edema, presents to the ED with cc of moderate elevated blood pressure times 1 day. Patient with recent admission for pneumonia acute congestive heart failure. Daughter is concerned given her recent pulmonary edema. Currently patient reports no shortness of breath but she has felt more short of breath of the last week. No change in patient's lower extremity edema. She denies any chest heaviness, productive cough, or fevers, or chills. She has not missed any of her medication dosages and has had no recent medication changes. She denies any dietary indiscretions. She has no other associated symptoms. She denies any other exacerbating or ameliorating factors. There are no other complaints, changes, or physical findings at this time. PCP: Elier Rojas MD 
 
No current facility-administered medications on file prior to encounter. Current Outpatient Medications on File Prior to Encounter Medication Sig Dispense Refill  metoprolol tartrate (LOPRESSOR) 100 mg IR tablet Take 1 Tab by mouth two (2) times a day. 60 Tab 0  
 pantoprazole (PROTONIX) 40 mg tablet Take 1 Tab by mouth Daily (before breakfast). 30 Tab 0  
 dilTIAZem CD (CARDIZEM CD) 120 mg ER capsule Take 1 Cap by mouth daily. 30 Cap 0  
 furosemide (LASIX) 40 mg tablet Take 40 mg by mouth daily.  pravastatin (PRAVACHOL) 80 mg tablet Take 80 mg by mouth nightly.  cholecalciferol (VITAMIN D3) 2,000 unit cap capsule Take 2,000 Units by mouth daily.  aspirin (ASPIRIN) 325 mg tablet Take 325 mg by mouth daily.  calcium-cholecalciferol, D3, (CALTRATE 600+D) tablet Take 1 Tab by mouth two (2) times a day.  peg 400-propylene glycol (SYSTANE, PROPYLENE GLYCOL,) 0.4-0.3 % drop Administer 1 Drop to both eyes two (2) times daily as needed (dry eyes).  metFORMIN (GLUCOPHAGE) 500 mg tablet Take 500 mg by mouth two (2) times daily (with meals).  levothyroxine (SYNTHROID) 125 mcg tablet Take 125 mcg by mouth Daily (before breakfast).  losartan (COZAAR) 100 mg tablet Take 100 mg by mouth daily.  glimepiride (AMARYL) 4 mg tablet Take 4-8 mg by mouth nightly as needed. BS >200 Past History Past Medical History: 
Past Medical History:  
Diagnosis Date  Diabetes ([address]  Hypercholesteremia  Hypertension  Thyroid condition Past Surgical History: 
Past Surgical History:  
Procedure Laterality Date  HX HYSTERECTOMY Family History: No family history on file. Social History: 
Social History Tobacco Use  Smoking status: Never Smoker Substance Use Topics  Alcohol use: No  
 Drug use: Not on file Allergies: Allergies Allergen Reactions  Lisinopril Swelling Review of Systems Review of Systems Constitutional: Negative. Negative for activity change, appetite change, chills, diaphoresis, fatigue, fever and unexpected weight change. HENT: Negative. Negative for congestion, ear pain, hearing loss, rhinorrhea, sneezing, sore throat and voice change. Eyes: Negative. Negative for pain, redness and visual disturbance. Respiratory: Positive for shortness of breath and wheezing. Negative for apnea, cough, choking and chest tightness. Cardiovascular: Positive for leg swelling. Negative for chest pain and palpitations. Gastrointestinal: Negative. Negative for abdominal distention, abdominal pain, blood in stool, diarrhea, nausea and vomiting. Endocrine: Negative for cold intolerance and heat intolerance. Genitourinary: Negative. Negative for difficulty urinating, flank pain, frequency, hematuria and urgency. No discharge Musculoskeletal: Negative. Negative for arthralgias, back pain, myalgias and neck stiffness. Skin: Negative. Negative for color change, rash and wound. Neurological: Negative. Negative for dizziness, seizures, syncope, speech difficulty, weakness, numbness and headaches. Hematological: Negative for adenopathy. Psychiatric/Behavioral: Negative. Negative for agitation, behavioral problems, dysphoric mood and suicidal ideas. The patient is not nervous/anxious. All other systems reviewed and are negative. Physical Exam  
Physical Exam  
Constitutional: She is oriented to person, place, and time. Patient is an elderly appearing female appears no acute distress. Speaking in full sentences. HENT:  
Head: Normocephalic and atraumatic. Mouth/Throat: Oropharynx is clear and moist. No oropharyngeal exudate. Eyes: Pupils are equal, round, and reactive to light. Conjunctivae and EOM are normal.  
Neck: Normal range of motion. Cardiovascular: Normal rate and regular rhythm. No murmur heard. Pulmonary/Chest: Effort normal and breath sounds normal. No respiratory distress. She has no wheezes. Abdominal: Soft. Bowel sounds are normal. She exhibits no distension. There is no tenderness. Musculoskeletal: Normal range of motion. She exhibits edema. She exhibits no deformity. 1+ lower extremity edema that is symmetric in nature. No surrounding erythema or tenderness. Neurovascular intact throughout both lower extremities. Neurological: She is alert and oriented to person, place, and time. Coordination normal.  
Skin: Skin is warm and dry. No rash noted. Psychiatric: She has a normal mood and affect. Her behavior is normal.  
Nursing note and vitals reviewed. Diagnostic Study Results Labs - No results found for this or any previous visit (from the past 24 hour(s)). Radiologic Studies -  
XR CHEST PORT Final Result IMPRESSION:  
1. Enlarged cardiopericardial silhouette. 2. Low lung volumes. Interstitial prominence is accentuated by low lung volumes,  
possible pulmonary vascular congestion. CT Results  (Last 48 hours) None CXR Results  (Last 48 hours) 03/31/19 2029  XR CHEST PORT Final result Impression:  IMPRESSION:  
1. Enlarged cardiopericardial silhouette. 2. Low lung volumes. Interstitial prominence is accentuated by low lung volumes,  
possible pulmonary vascular congestion. Narrative:  INDICATION: . shortness of breath Additional history: Congestive heart failure. Hypertension x3 days. COMPARISON: Previous chest xray, 2/24/2019, 2/22/2019 and 2/16/2013. LIMITATIONS: Portable technique. Apical lordotic angulation [place] FINDINGS: Single frontal view of the chest.   
.  
Lines/tubes/surgical: None. Heart/mediastinum: Enlarged cardiopericardial silhouette which is accentuated by  
technique. Calcifications in the aortic arch Lungs/pleura: Low lung volumes. Interstitial prominence. No visualized pleural  
effusion or pneumothorax. Additional Comments: None. .  
  
  
 
 
 
Medical Decision Making I am the first provider for this patient. I reviewed the vital signs, available nursing notes, past medical history, past surgical history, family history and social history. Vital Signs-Reviewed the patient's vital signs. Patient Vitals for the past 24 hrs: 
 Pulse Resp BP SpO2  
03/31/19 2200 83 16 (!) 171/91 97 % 03/31/19 2145 78 18 (!) 188/106 97 % 03/31/19 2143 79 17 198/86 96 % 03/31/19 2141 78 16 (!) 202/99 96 % 03/31/19 2132 78 17 171/77 96 % Pulse Oximetry Analysis -96 % on room air Cardiac Monitor:  
Rate: 80 bpm 
Rhythm: Normal Sinus Rhythm Records Reviewed: Nursing Notes and Old Medical Records Differential Diagnosis: 
 
Patient presents with acute dyspnea. DDx: asthma, copd, pna, pulmonary edema, acute bronchitis, ACS, ptx, pna. Will obtain EKG, labs, CXR, provide O2 as needed for hypoxia, treat symptomatically and reassess. Will continue to monitor closely in ED. Provider Notes (Medical Decision Making): EKG: Atrial fibrillation with rate of 85. No acute ST or T wave abnormality's. No STEMI. Incomplete right bundle branch block. Patient with no significant pulmonary edema on chest x-ray, normal pulse oximeter throughout stay, and no significant respiratory distress. Patient's blood pressure is improving with medications in the emergency department. I do feel patient's symptoms will require close follow-up with her cardiologist to continue to evaluate her chronic hypertension. She has had significant diuresis in the ER after Lasix. She will follow-up closely with Dr. Linda Jackman as an outpatient. ED Course:  
 
Initial assessment performed. The patients presenting problems have been discussed, and they are in agreement with the care plan formulated and outlined with them. I have encouraged them to ask questions as they arise throughout their visit. Critical Care Time:  
 
None Disposition: 
9:40 PM 
Dinora Fernández's  results have been reviewed with her. She has been counseled regarding her diagnosis. She verbally conveys understanding and agreement of the signs, symptoms, diagnosis, treatment and prognosis and additionally agrees to follow up as recommended with Dr. Elizabeth Ham MD in 24 - 48 hours. She also agrees with the care-plan and conveys that all of her questions have been answered.   I have also put together some discharge instructions for her that include: 1) educational information regarding their diagnosis, 2) how to care for their diagnosis at home, as well a 3) list of reasons why they would want to return to the ED prior to their follow-up appointment, should their condition change. PLAN: 
1. Discharge Medication List as of 3/31/2019  9:45 PM  
  
 
2. Follow-up Information Follow up With Specialties Details Why Contact Info Suman Villanueva MD Family Practice In 2 days  [address] [facility] [email or web address] [facility] 
[phone] Patricia Castro MD Cardiology In 3 days  [address] [facility] 
[phone] Return to ED if worse Diagnosis Clinical Impression: 1. Congestive heart failure, unspecified HF chronicity, unspecified heart failure type ([address] 2. Acute pulmonary edema (HCC) 3. Hypokalemia

## 2019-04-01 NOTE — DISCHARGE INSTRUCTIONS
Hypokalemia: Care Instructions  Your Care Instructions    Hypokalemia (say \"yv-sa-[id number]-GAUDENCIO-devin-uh\") is a low level of potassium. The heart, muscles, kidneys, and nervous system all need potassium to work well. This problem has many different causes. Kidney problems, diet, and medicines like diuretics and laxatives can cause it. So can vomiting or diarrhea. In some cases, cancer is the cause. Your doctor may do tests to find the cause of your low potassium levels. You may need medicines to bring your potassium levels back to normal. You may also need regular blood tests to check your potassium. If you have very low potassium, you may need intravenous (IV) medicines. You also may need tests to check the electrical activity of your heart. Heart problems caused by low potassium levels can be very serious. Follow-up care is a key part of your treatment and safety. Be sure to make and go to all appointments, and call your doctor if you are having problems. It's also a good idea to know your test results and keep a list of the medicines you take. How can you care for yourself at home? · If your doctor recommends it, eat foods that have a lot of potassium. These include fresh fruits, juices, and vegetables. They also include nuts, beans, and milk. · Be safe with medicines. If your doctor prescribes medicines or potassium supplements, take them exactly as directed. Call your doctor if you have any problems with your medicines. · Get your potassium levels tested as often as your doctor tells you. When should you call for help? Call 911 anytime you think you may need emergency care. For example, call if:    · You feel like your heart is missing beats. Heart problems caused by low potassium can cause death.     · You passed out (lost consciousness).     · You have a seizure.    Call your doctor now or seek immediate medical care if:    · You feel weak or unusually tired.     · You have severe arm or leg cramps.   · You have tingling or numbness.     · You feel sick to your stomach, or you vomit.     · You have belly cramps.     · You feel bloated or constipated.     · You have to urinate a lot.     · You feel very thirsty most of the time.     · You are dizzy or lightheaded, or you feel like you may faint.     · You feel depressed, or you lose touch with reality.    Watch closely for changes in your health, and be sure to contact your doctor if:    · You do not get better as expected. Where can you learn more? Go to http://sin-amira.info/. Enter G358 in the search box to learn more about \"Hypokalemia: Care Instructions. \"  Current as of: March 14, 2018  Content Version: 11.9  © 0875-8361 [employer]. Care instructions adapted under license by [employer] (which disclaims liability or warranty for this information). If you have questions about a medical condition or this instruction, always ask your healthcare professional. [place] [address] any warranty or liability for your use of this information. Patient Education        Heart Failure: Care Instructions  Your Care Instructions    Heart failure occurs when your heart does not pump as much blood as the body needs. Failure does not mean that the heart has stopped pumping but rather that it is not pumping as well as it should. Over time, this causes fluid buildup in your lungs and other parts of your body. Fluid buildup can cause shortness of breath, fatigue, swollen ankles, and other problems. By taking medicines regularly, reducing sodium (salt) in your diet, checking your weight every day, and making lifestyle changes, you can feel better and live longer. Follow-up care is a key part of your treatment and safety. Be sure to make and go to all appointments, and call your doctor if you are having problems. It's also a good idea to know your test results and keep a list of the medicines you take.   How can you care for yourself at home? Medicines    · Be safe with medicines. Take your medicines exactly as prescribed. Call your doctor if you think you are having a problem with your medicine.     · Do not take any vitamins, over-the-counter medicine, or herbal products without talking to your doctor first. Leigh Jacinto not take ibuprofen (Advil or Motrin) and naproxen (Aleve) without talking to your doctor first. They could make your heart failure worse.     · You may be taking some of the following medicine. ? Beta-blockers can slow heart rate, decrease blood pressure, and improve your condition. Taking a beta-blocker may lower your chance of needing to be hospitalized. ? Angiotensin-converting enzyme inhibitors (ACEIs) reduce the heart's workload, lower blood pressure, and reduce swelling. Taking an ACEI may lower your chance of needing to be hospitalized again. ? Angiotensin II receptor blockers (ARBs) work like ACEIs. Your doctor may prescribe them instead of ACEIs. ? Diuretics, also called water pills, reduce swelling. ? Potassium supplements replace this important mineral, which is sometimes lost with diuretics. ? Aspirin and other blood thinners prevent blood clots, which can cause a stroke or heart attack.    You will get more details on the specific medicines your doctor prescribes. Diet    · Your doctor may suggest that you limit sodium to 2,000 milligrams (mg) a day or less. That is less than 1 teaspoon of salt a day, including all the salt you eat in cooking or in packaged foods. People get most of their sodium from processed foods. Fast food and restaurant meals also tend to be very high in sodium.     · Ask your doctor how much liquid you can drink each day. You may have to limit liquids.    Weight    · Weigh yourself without clothing at the same time each day. Record your weight. Call your doctor if you have a sudden weight gain, such as more than 2 to 3 pounds in a day or 5 pounds in a week.  (Your doctor may suggest a different range of weight gain.) A sudden weight gain may mean that your heart failure is getting worse.    Activity level    · Start light exercise (if your doctor says it is okay). Even if you can only do a small amount, exercise will help you get stronger, have more energy, and manage your weight and your stress. Walking is an easy way to get exercise. Start out by walking a little more than you did before. Bit by bit, increase the amount you walk.     · When you exercise, watch for signs that your heart is working too hard. You are pushing yourself too hard if you cannot talk while you are exercising. If you become short of breath or dizzy or have chest pain, stop, sit down, and rest.     · If you feel \"wiped out\" the day after you exercise, walk slower or for a shorter distance until you can work up to a better pace.     · Get enough rest at night. Sleeping with 1 or 2 pillows under your upper body and head may help you breathe easier.    Lifestyle changes    · Do not smoke. Smoking can make a heart condition worse. If you need help quitting, talk to your doctor about stop-smoking programs and medicines. These can increase your chances of quitting for good. Quitting smoking may be the most important step you can take to protect your heart.     · Limit alcohol to 2 drinks a day for men and 1 drink a day for women. Too much alcohol can cause health problems.     · Avoid getting sick from colds and the flu. Get a pneumococcal vaccine shot. If you have had one before, ask your doctor whether you need another dose. Get a flu shot each year. If you must be around people with colds or the flu, wash your hands often. When should you call for help?   Call 911 if you have symptoms of sudden heart failure such as:    · You have severe trouble breathing.     · You cough up pink, foamy mucus.     · You have a new irregular or rapid heartbeat.    Call your doctor now or seek immediate medical care if:    · You have new or increased shortness of breath.     · You are dizzy or lightheaded, or you feel like you may faint.     · You have sudden weight gain, such as more than 2 to 3 pounds in a day or 5 pounds in a week. (Your doctor may suggest a different range of weight gain.)     · You have increased swelling in your legs, ankles, or feet.     · You are suddenly so tired or weak that you cannot do your usual activities.    Watch closely for changes in your health, and be sure to contact your doctor if you develop new symptoms. Where can you learn more? Go to http://sin-amira.info/. Enter H941 in the search box to learn more about \"Heart Failure: Care Instructions. \"  Current as of: July 22, 2018  Content Version: 11.9  © 2116-6619 [employer]. Care instructions adapted under license by [employer] (which disclaims liability or warranty for this information). If you have questions about a medical condition or this instruction, always ask your healthcare professional. [place] [address] any warranty or liability for your use of this information. Patient Education        Learning About Pulmonary Edema  What is pulmonary edema? Pulmonary edema is the buildup of fluid in the lungs. It usually happens when the heart does not pump blood through the body as well as it should. Blood can back up into the blood vessels that carry blood from the lungs to the heart. Blood pressure rises in those blood vessels, and fluid is pushed into the lungs. This makes it hard to breathe. Other symptoms include a new irregular or rapid heartbeat and coughing up foamy, pink mucus. Pulmonary edema can also be caused by another disease, such as liver or kidney failure. It can also happen at high altitudes, from a poisoning, or by a near-drowning. A chest X-ray and a blood test are often used to identify this condition. Pulmonary edema is serious.  You may need special care, such as being in the intensive care unit (ICU). This may worry you. But the hospital staff understands this. They will explain what happens and will answer your questions. How is it treated? The goal of treatment is to relieve the fluid buildup in your lungs and help you breathe more easily. The doctor may:  · Give you medicines to help relieve the fluid buildup. · Give you oxygen through the nose or a face mask. You may need a breathing machine (ventilator) and have a breathing tube placed into your windpipe (intubation). Your treatment also depends on what caused the edema. For example, you may also get medicines to help your heart pump blood more easily. You may get medicine through a vein (IV, or intravenously). What can you expect when you have this condition? · You may be connected to special machines that will check how well your heart is doing. · The hospital staff will watch your heart rate and fluid balance carefully. Follow-up care is a key part of your treatment and safety. Be sure to make and go to all appointments, and call your doctor if you are having problems. It's also a good idea to know your test results and keep a list of the medicines you take. Where can you learn more? Go to http://sin-amira.info/. Enter  in the search box to learn more about \"Learning About Pulmonary Edema. \"  Current as of: September 5, 2018  Content Version: 11.9  © 6907-0955 [employer] Care instructions adapted under license by [employer] (which disclaims liability or warranty for this information). If you have questions about a medical condition or this instruction, always ask your healthcare professional. [place] [address] any warranty or liability for your use of this information. Patient Education        Learning About High Blood Pressure  What is high blood pressure?     Blood pressure is a measure of how hard the blood pushes against the walls of your arteries. It's normal for blood pressure to go up and down throughout the day, but if it stays up, you have high blood pressure. Another name for high blood pressure is hypertension. Two numbers tell you your blood pressure. The first number is the systolic pressure. It shows how hard the blood pushes when your heart is pumping. The second number is the diastolic pressure. It shows how hard the blood pushes between heartbeats, when your heart is relaxed and filling with blood. Your doctor will give you a goal for your blood pressure. Your goal will be based on your health and your age. High blood pressure (hypertension) means that the top number stays high, or the bottom number stays high, or both. High blood pressure increases the risk of stroke, heart attack, and other problems. You and your doctor will talk about your risks of these problems based on your blood pressure. What happens when you have high blood pressure? · Blood flows through your arteries with too much force. Over time, this damages the walls of your arteries. But you can't feel it. High blood pressure usually doesn't cause symptoms. · Fat and calcium start to build up in your arteries. This buildup is called plaque. Plaque makes your arteries narrower and stiffer. Blood can't flow through them as easily. · This lack of good blood flow starts to damage some of the organs in your body. This can lead to problems such as coronary artery disease and heart attack, heart failure, stroke, kidney failure, and eye damage. How can you prevent high blood pressure? · Stay at a healthy weight. · Try to limit how much sodium you eat to less than 2,300 milligrams (mg) a day. If you limit your sodium to 1,500 mg a day, you can lower your blood pressure even more. ? Buy foods that are labeled \"unsalted,\" \"sodium-free,\" or \"low-sodium. \" Foods labeled \"reduced-sodium\" and \"light sodium\" may still have too much sodium. ? Flavor your food with garlic, lemon juice, onion, vinegar, herbs, and spices instead of salt. Do not use soy sauce, steak sauce, onion salt, garlic salt, mustard, or ketchup on your food. ? Use less salt (or none) when recipes call for it. You can often use half the salt a recipe calls for without losing flavor. · Be physically active. Get at least 30 minutes of exercise on most days of the week. Walking is a good choice. You also may want to do other activities, such as running, swimming, cycling, or playing tennis or team sports. · Limit alcohol to 2 drinks a day for men and 1 drink a day for women. · Eat plenty of fruits, vegetables, and low-fat dairy products. Eat less saturated and total fats. How is high blood pressure treated? · Your doctor will suggest making lifestyle changes. For example, your doctor may ask you to eat healthy foods, quit smoking, lose extra weight, and be more active. · If lifestyle changes don't help enough, your doctor may recommend that you take medicine. · When blood pressure is very high, medicines are needed to lower it. Follow-up care is a key part of your treatment and safety. Be sure to make and go to all appointments, and call your doctor if you are having problems. It's also a good idea to know your test results and keep a list of the medicines you take. Where can you learn more? Go to http://sin-amira.info/. Enter P501 in the search box to learn more about \"Learning About High Blood Pressure. \"  Current as of: July 22, 2018  Content Version: 11.9  © 1179-8504 [employer] Care instructions adapted under license by [employer] (which disclaims liability or warranty for this information). If you have questions about a medical condition or this instruction, always ask your healthcare professional. [address] any warranty or liability for your use of this information.

## 2019-04-09 ENCOUNTER — HOSPITAL ENCOUNTER (EMERGENCY)
Age: 79
Discharge: HOME | End: 2019-04-09
Attending: EMERGENCY MEDICINE
Payer: MEDICARE

## 2019-04-09 ENCOUNTER — APPOINTMENT (OUTPATIENT)
Dept: CT IMAGING | Age: 79
End: 2019-04-09
Attending: PHYSICIAN ASSISTANT
Payer: MEDICARE

## 2019-04-09 VITALS
WEIGHT: 200.4 LBS | DIASTOLIC BLOOD PRESSURE: 90 MMHG | SYSTOLIC BLOOD PRESSURE: 168 MMHG | RESPIRATION RATE: 14 BRPM | HEART RATE: 67 BPM | BODY MASS INDEX: 30.37 KG/M2 | HEIGHT: 68 IN | OXYGEN SATURATION: 97 % | TEMPERATURE: 97.9 F

## 2019-04-09 DIAGNOSIS — K52.9 COLITIS: Primary | ICD-10-CM

## 2019-04-09 LAB
ABO + RH BLD: NORMAL
ALBUMIN SERPL-MCNC: 3.4 G/DL (ref 3.5–5)
ALBUMIN/GLOB SERPL: 0.8 {RATIO} (ref 1.1–2.2)
ALP SERPL-CCNC: 73 U/L (ref 45–117)
ALT SERPL-CCNC: 19 U/L (ref 12–78)
ANION GAP SERPL CALC-SCNC: 5 MMOL/L (ref 5–15)
AST SERPL-CCNC: 28 U/L (ref 15–37)
BASOPHILS # BLD: 0 K/UL (ref 0–0.1)
BASOPHILS NFR BLD: 1 % (ref 0–1)
BILIRUB SERPL-MCNC: 1.2 MG/DL (ref 0.2–1)
BLOOD GROUP ANTIBODIES SERPL: NORMAL
BUN SERPL-MCNC: 11 MG/DL (ref 6–20)
BUN/CREAT SERPL: 13 (ref 12–20)
CALCIUM SERPL-MCNC: 8.9 MG/DL (ref 8.5–10.1)
CHLORIDE SERPL-SCNC: 102 MMOL/L (ref 97–108)
CO2 SERPL-SCNC: 31 MMOL/L (ref 21–32)
CREAT SERPL-MCNC: 0.82 MG/DL (ref 0.55–1.02)
DIFFERENTIAL METHOD BLD: NORMAL
EOSINOPHIL # BLD: 0 K/UL (ref 0–0.4)
EOSINOPHIL NFR BLD: 0 % (ref 0–7)
ERYTHROCYTE [DISTWIDTH] IN BLOOD BY AUTOMATED COUNT: 13.7 % (ref 11.5–14.5)
GLOBULIN SER CALC-MCNC: 4.4 G/DL (ref 2–4)
GLUCOSE SERPL-MCNC: 131 MG/DL (ref 65–100)
HCT VFR BLD AUTO: 40.3 % (ref 35–47)
HEMOCCULT STL QL: NEGATIVE
HGB BLD-MCNC: 13.4 G/DL (ref 11.5–16)
IMM GRANULOCYTES # BLD AUTO: 0 K/UL (ref 0–0.04)
IMM GRANULOCYTES NFR BLD AUTO: 0 % (ref 0–0.5)
INR PPP: 1.1 (ref 0.9–1.1)
LYMPHOCYTES # BLD: 1.9 K/UL (ref 0.8–3.5)
LYMPHOCYTES NFR BLD: 24 % (ref 12–49)
MCH RBC QN AUTO: 30.6 PG (ref 26–34)
MCHC RBC AUTO-ENTMCNC: 33.3 G/DL (ref 30–36.5)
MCV RBC AUTO: 92 FL (ref 80–99)
MONOCYTES # BLD: 0.6 K/UL (ref 0–1)
MONOCYTES NFR BLD: 8 % (ref 5–13)
NEUTS SEG # BLD: 5.3 K/UL (ref 1.8–8)
NEUTS SEG NFR BLD: 67 % (ref 32–75)
NRBC # BLD: 0 K/UL (ref 0–0.01)
NRBC BLD-RTO: 0 PER 100 WBC
PLATELET # BLD AUTO: 286 K/UL (ref 150–400)
PMV BLD AUTO: 9.6 FL (ref 8.9–12.9)
POTASSIUM SERPL-SCNC: 3.4 MMOL/L (ref 3.5–5.1)
PROT SERPL-MCNC: 7.8 G/DL (ref 6.4–8.2)
PROTHROMBIN TIME: 11.6 SEC (ref 9–11.1)
RBC # BLD AUTO: 4.38 M/UL (ref 3.8–5.2)
SODIUM SERPL-SCNC: 138 MMOL/L (ref 136–145)
SPECIMEN EXP DATE BLD: NORMAL
WBC # BLD AUTO: 7.9 K/UL (ref 3.6–11)

## 2019-04-09 PROCEDURE — 74178 CT ABD&PLV WO CNTR FLWD CNTR: CPT

## 2019-04-09 PROCEDURE — 85025 COMPLETE CBC W/AUTO DIFF WBC: CPT

## 2019-04-09 PROCEDURE — 85610 PROTHROMBIN TIME: CPT

## 2019-04-09 PROCEDURE — 74011250637 HC RX REV CODE- 250/637: Performed by: PHYSICIAN ASSISTANT

## 2019-04-09 PROCEDURE — 80053 COMPREHEN METABOLIC PANEL: CPT

## 2019-04-09 PROCEDURE — 74011636320 HC RX REV CODE- 636/320: Performed by: EMERGENCY MEDICINE

## 2019-04-09 PROCEDURE — 36415 COLL VENOUS BLD VENIPUNCTURE: CPT

## 2019-04-09 PROCEDURE — 99285 EMERGENCY DEPT VISIT HI MDM: CPT

## 2019-04-09 PROCEDURE — 82272 OCCULT BLD FECES 1-3 TESTS: CPT

## 2019-04-09 PROCEDURE — 86900 BLOOD TYPING SEROLOGIC ABO: CPT

## 2019-04-09 RX ORDER — CIPROFLOXACIN 500 MG/1
500 TABLET ORAL
Status: COMPLETED | OUTPATIENT
Start: 2019-04-09 | End: 2019-04-09

## 2019-04-09 RX ORDER — METRONIDAZOLE 250 MG/1
500 TABLET ORAL
Status: COMPLETED | OUTPATIENT
Start: 2019-04-09 | End: 2019-04-09

## 2019-04-09 RX ORDER — METRONIDAZOLE 500 MG/1
500 TABLET ORAL 2 TIMES DAILY
Qty: 14 TAB | Refills: 0 | Status: SHIPPED | OUTPATIENT
Start: 2019-04-09 | End: 2019-04-16

## 2019-04-09 RX ORDER — SODIUM CHLORIDE 0.9 % (FLUSH) 0.9 %
10 SYRINGE (ML) INJECTION
Status: COMPLETED | OUTPATIENT
Start: 2019-04-09 | End: 2019-04-09

## 2019-04-09 RX ORDER — CIPROFLOXACIN 500 MG/1
500 TABLET ORAL 2 TIMES DAILY
Qty: 14 TAB | Refills: 0 | Status: SHIPPED | OUTPATIENT
Start: 2019-04-09 | End: 2019-04-16

## 2019-04-09 RX ADMIN — METRONIDAZOLE 500 MG: 250 TABLET ORAL at 21:51

## 2019-04-09 RX ADMIN — IOPAMIDOL 100 ML: 755 INJECTION, SOLUTION INTRAVENOUS at 19:17

## 2019-04-09 RX ADMIN — CIPROFLOXACIN 500 MG: 500 TABLET, FILM COATED ORAL at 21:51

## 2019-04-09 RX ADMIN — Medication 10 ML: at 19:17

## 2019-04-09 NOTE — ED NOTES
Assumed care of patient from triage. Patient is A&Ox4, respirations even and unlabored. Pt. States she had lower abdominal pain since last night which has now resolved. Patient reports last BM 2 days ago and was able to move bowels today after straining. Noted bright red streaks of blood in resultant BM.

## 2019-04-09 NOTE — ED PROVIDER NOTES
EMERGENCY DEPARTMENT HISTORY AND PHYSICAL EXAM 
 
 
Date: 4/9/2019 Patient Name: Garlin Prader History of Presenting Illness Chief Complaint Patient presents with  Abdominal Pain Patient complain of abdominal pain earlier today  Rectal Bleeding Patient states that after a bowel movement she noticed bright blood in stool History Provided By: Patient HPI: Garlin Prader, 77 y.o. female with PMHx significant for diabetes, hypercholesterolemia, hypertension, hypothyroidism, presents to the ED with cc of abdominal pain and rectal bleeding. Patient reports that she began feeling constipated about 2 days ago. She ate some chocolate, which usually helps her use the bathroom when she is constipated, but was still having difficulty going. Yesterday, she developed some lower abdominal pain. She was able to use the bathroom after that had a large bowel movement. Today, she used the bathroom again but noted bright red blood in the toilet. Her lower abdominal pain is intermittent and described as an aching type pain. There are no modifying factors. She denies fever, nausea, vomiting, dysuria, hematuria, chest pain, shortness of breath. She takes aspirin but denies any anticoagulant use. There are no other complaints, changes, or physical findings at this time. PCP: Elton Lugo MD 
 
No current facility-administered medications on file prior to encounter. Current Outpatient Medications on File Prior to Encounter Medication Sig Dispense Refill  potassium chloride SR (KLOR-CON 10) 10 mEq tablet Take 1 Tab by mouth daily. 30 Tab 0  
 metoprolol tartrate (LOPRESSOR) 100 mg IR tablet Take 1 Tab by mouth two (2) times a day. 60 Tab 0  
 pantoprazole (PROTONIX) 40 mg tablet Take 1 Tab by mouth Daily (before breakfast). 30 Tab 0  
 dilTIAZem CD (CARDIZEM CD) 120 mg ER capsule Take 1 Cap by mouth daily.  30 Cap 0  
  furosemide (LASIX) 40 mg tablet Take 40 mg by mouth daily.  pravastatin (PRAVACHOL) 80 mg tablet Take 80 mg by mouth nightly.  cholecalciferol (VITAMIN D3) 2,000 unit cap capsule Take 2,000 Units by mouth daily.  aspirin (ASPIRIN) 325 mg tablet Take 325 mg by mouth daily.  calcium-cholecalciferol, D3, (CALTRATE 600+D) tablet Take 1 Tab by mouth two (2) times a day.  peg 400-propylene glycol (SYSTANE, PROPYLENE GLYCOL,) 0.4-0.3 % drop Administer 1 Drop to both eyes two (2) times daily as needed (dry eyes).  metFORMIN (GLUCOPHAGE) 500 mg tablet Take 500 mg by mouth two (2) times daily (with meals).  levothyroxine (SYNTHROID) 125 mcg tablet Take 125 mcg by mouth Daily (before breakfast).  losartan (COZAAR) 100 mg tablet Take 100 mg by mouth daily.  glimepiride (AMARYL) 4 mg tablet Take 4-8 mg by mouth nightly as needed. BS >200 Past History Past Medical History: 
Past Medical History:  
Diagnosis Date  Diabetes ([address]  Hypercholesteremia  Hypertension  Thyroid condition Past Surgical History: 
Past Surgical History:  
Procedure Laterality Date  HX HYSTERECTOMY Family History: 
History reviewed. No pertinent family history. Social History: 
Social History Tobacco Use  Smoking status: Never Smoker Substance Use Topics  Alcohol use: No  
 Drug use: Not on file Allergies: Allergies Allergen Reactions  Lisinopril Swelling Review of Systems Review of Systems Constitutional: Negative for chills and fever. HENT: Negative for ear pain and sore throat. Eyes: Negative for redness and visual disturbance. Respiratory: Negative for cough and shortness of breath. Cardiovascular: Negative for chest pain and palpitations. Gastrointestinal: Positive for abdominal pain, blood in stool and constipation. Negative for diarrhea, nausea and vomiting. Genitourinary: Negative for dysuria and hematuria. Musculoskeletal: Negative for back pain and gait problem. Skin: Negative for rash and wound. Neurological: Negative for dizziness and headaches. Psychiatric/Behavioral: Negative for behavioral problems and confusion. All other systems reviewed and are negative. Physical Exam  
Physical Exam  
Constitutional: She is oriented to person, place, and time. She appears well-developed and well-nourished. No distress. HENT:  
Head: Normocephalic and atraumatic. Eyes: Pupils are equal, round, and reactive to light. Conjunctivae and EOM are normal.  
Neck: Normal range of motion. Neck supple. Cardiovascular: Normal rate, regular rhythm and normal heart sounds. Pulmonary/Chest: Effort normal and breath sounds normal.  
Abdominal: Soft. She exhibits no distension. There is tenderness (mild lower). There is no rebound and no guarding. Genitourinary:  
Genitourinary Comments: Exam chaperoned by Palak Aquino RN. Small external hemorrhoids noted. No masses palpated on digital rectal exam. Small amount of brown stool. Musculoskeletal: Normal range of motion. Neurological: She is alert and oriented to person, place, and time. She has normal strength. No cranial nerve deficit or sensory deficit. GCS eye subscore is 4. GCS verbal subscore is 5. GCS motor subscore is 6. Skin: Skin is warm and dry. No rash noted. Psychiatric: She has a normal mood and affect. Her behavior is normal.  
Nursing note and vitals reviewed. Diagnostic Study Results Labs - Recent Results (from the past 12 hour(s)) CBC WITH AUTOMATED DIFF Collection Time: 04/09/19  3:23 PM  
Result Value Ref Range WBC 7.9 3.6 - 11.0 K/uL  
 RBC 4.38 3.80 - 5.20 M/uL  
 HGB 13.4 11.5 - 16.0 g/dL HCT 40.3 35.0 - 47.0 % MCV 92.0 80.0 - 99.0 FL  
 MCH 30.6 26.0 - 34.0 PG  
 MCHC 33.3 30.0 - 36.5 g/dL  
 RDW 13.7 11.5 - 14.5 % PLATELET 889 970 - 295 K/uL MPV 9.6 8.9 - 12.9 FL  
 NRBC 0.0 0  WBC ABSOLUTE NRBC 0.00 0.00 - 0.01 K/uL NEUTROPHILS 67 32 - 75 % LYMPHOCYTES 24 12 - 49 % MONOCYTES 8 5 - 13 % EOSINOPHILS 0 0 - 7 % BASOPHILS 1 0 - 1 % IMMATURE GRANULOCYTES 0 0.0 - 0.5 % ABS. NEUTROPHILS 5.3 1.8 - 8.0 K/UL  
 ABS. LYMPHOCYTES 1.9 0.8 - 3.5 K/UL  
 ABS. MONOCYTES 0.6 0.0 - 1.0 K/UL  
 ABS. EOSINOPHILS 0.0 0.0 - 0.4 K/UL  
 ABS. BASOPHILS 0.0 0.0 - 0.1 K/UL  
 ABS. IMM. GRANS. 0.0 0.00 - 0.04 K/UL  
 DF AUTOMATED METABOLIC PANEL, COMPREHENSIVE Collection Time: 04/09/19  3:23 PM  
Result Value Ref Range Sodium 138 136 - 145 mmol/L Potassium 3.4 (L) 3.5 - 5.1 mmol/L Chloride 102 97 - 108 mmol/L  
 CO2 31 21 - 32 mmol/L Anion gap 5 5 - 15 mmol/L Glucose 131 (H) 65 - 100 mg/dL BUN 11 6 - 20 MG/DL Creatinine 0.82 0.55 - 1.02 MG/DL  
 BUN/Creatinine ratio 13 12 - 20 GFR est AA >60 >60 ml/min/1.73m2 GFR est non-AA >60 >60 ml/min/1.73m2 Calcium 8.9 8.5 - 10.1 MG/DL Bilirubin, total 1.2 (H) 0.2 - 1.0 MG/DL  
 ALT (SGPT) 19 12 - 78 U/L  
 AST (SGOT) 28 15 - 37 U/L Alk. phosphatase 73 45 - 117 U/L Protein, total 7.8 6.4 - 8.2 g/dL Albumin 3.4 (L) 3.5 - 5.0 g/dL Globulin 4.4 (H) 2.0 - 4.0 g/dL A-G Ratio 0.8 (L) 1.1 - 2.2 TYPE & SCREEN Collection Time: 04/09/19  3:23 PM  
Result Value Ref Range Crossmatch Expiration 04/12/2019 ABO/Rh(D) O POSITIVE Antibody screen NEG PROTHROMBIN TIME + INR Collection Time: 04/09/19  3:23 PM  
Result Value Ref Range INR 1.1 0.9 - 1.1 Prothrombin time 11.6 (H) 9.0 - 11.1 sec OCCULT BLOOD, STOOL Collection Time: 04/09/19  5:53 PM  
Result Value Ref Range Occult blood, stool NEGATIVE  NEG Radiologic Studies -  
CT ABD PELV W WO CONT Final Result IMPRESSION:   
1. No evidence of active GI bleed. 2. Significant thickening and inflammation of the descending and sigmoid colon  
compatible with colitis. 3. Incidental right hepatic portal systemic shunt. 4. Enlarged right atrium. 5. Reflux into hepatic veins suggesting tricuspid regurgitation. 6. Mild ascites. CT Results  (Last 48 hours) 04/09/19 1917  CT ABD PELV W WO CONT Final result Impression:  IMPRESSION:   
1. No evidence of active GI bleed. 2. Significant thickening and inflammation of the descending and sigmoid colon  
compatible with colitis. 3. Incidental right hepatic portal systemic shunt. 4. Enlarged right atrium. 5. Reflux into hepatic veins suggesting tricuspid regurgitation. 6. Mild ascites. Narrative:  EXAM: CT ABD PELV W WO CONT INDICATION: Abdominal pain, episode of rectal bleeding today, evaluate for  
diverticulitis and bleeding COMPARISON: None. CONTRAST: 100 mL of Isovue-370. TECHNIQUE:    
Multislice helical CT was performed from the diaphragm to the iliac crest prior  
to intravenous contrast administration and from the diaphragm to the symphysis  
pubis during uneventful rapid bolus intravenous contrast administration. Oral  
contrast administered. Contiguous 5 mm axial images were reconstructed and lung  
and soft tissue windows were generated. Coronal and sagittal reformations were  
generated. CT dose reduction was achieved through use of a standardized  
protocol tailored for this examination and automatic exposure control for dose  
modulation. FINDINGS:  
There is minimal atelectasis or scar in the lower lobes and right middle lobe. There is significant enlargement of the right atrium. The precontrast images demonstrate inflammation adjacent to the descending and  
sigmoid colon and perirectal inflammation. Following contrast administration, there is significant right ventricular  
enlargement. There is reflux into hepatic veins. There are hepatic cysts. There  
is a intrahepatic portal systemic shunt. The spleen is unremarkable. An active GI bleed is not identified. There are renal cysts. The pancreas appears  
unremarkable. There is significant thickening of the descending colon and  
sigmoid colon with pericolonic inflammation. There is mild ascites adjacent to  
the liver. The aorta tapers without aneurysm. There is no retroperitoneal adenopathy or  
mass. There is no ascites or free intraperitoneal air. The appendix is not identified. The uterus is surgically absent. There is no pelvic mass or adenopathy. There are degenerative changes of the spine. CXR Results  (Last 48 hours) None Medical Decision Making I am the first provider for this patient. I reviewed the vital signs, available nursing notes, past medical history, past surgical history, family history and social history. Vital Signs-Reviewed the patient's vital signs. Patient Vitals for the past 12 hrs: 
 Temp Pulse Resp BP SpO2  
04/09/19 2130  67 14 168/90 97 % 04/09/19 2115  64 14  97 % 04/09/19 2100  68 13  97 % 04/09/19 2045  67 12  97 % 04/09/19 2030  63 15  98 % 04/09/19 2017  66 16 164/71 96 % 04/09/19 2000  63 15 179/90 95 % 04/09/19 1945  70 13 191/71 97 % 04/09/19 1934  71 14 175/76 97 % 04/09/19 1636  70 16 (!) 189/93 97 % 04/09/19 1353 97.9 °F (36.6 °C) 80 16 (!) 201/101 98 % Records Reviewed: Nursing Notes, Old Medical Records, Previous Radiology Studies and Previous Laboratory Studies Provider Notes (Medical Decision Making):  
Patient presents with lower abdominal pain and episode of blood in toilet. Differential diagnosis includes bleeding diverticula, bleeding hemorrhoids, diverticulitis, colitis, anemia. ED Course:  
Initial assessment performed. The patients presenting problems have been discussed, and they are in agreement with the care plan formulated and outlined with them. I have encouraged them to ask questions as they arise throughout their visit. Disposition: 
9:34 PM - The patient has been re-evaluated and is ready for discharge. Reviewed available results with patient. Counseled patient on diagnosis and care plan. Patient has expressed understanding, and all questions have been answered. Patient agrees with plan and agrees to follow up as recommended, or to return to the ED if their symptoms worsen. Discharge instructions have been provided and explained to the patient, along with reasons to return to the ED. PLAN: 
1. Discharge Medication List as of 4/9/2019  9:34 PM  
  
START taking these medications Details  
ciprofloxacin HCl (CIPRO) 500 mg tablet Take 1 Tab by mouth two (2) times a day for 7 days. , Normal, Disp-14 Tab, R-0  
  
metroNIDAZOLE (FLAGYL) 500 mg tablet Take 1 Tab by mouth two (2) times a day for 7 days. , Normal, Disp-14 Tab, R-0  
  
  
CONTINUE these medications which have NOT CHANGED Details  
potassium chloride SR (KLOR-CON 10) 10 mEq tablet Take 1 Tab by mouth daily. , Print, Disp-30 Tab, R-0  
  
metoprolol tartrate (LOPRESSOR) 100 mg IR tablet Take 1 Tab by mouth two (2) times a day., Print, Disp-60 Tab, R-0  
  
pantoprazole (PROTONIX) 40 mg tablet Take 1 Tab by mouth Daily (before breakfast). , Print, Disp-30 Tab, R-0  
  
dilTIAZem CD (CARDIZEM CD) 120 mg ER capsule Take 1 Cap by mouth daily. , Print, Disp-30 Cap, R-0  
  
furosemide (LASIX) 40 mg tablet Take 40 mg by mouth daily. , Historical Med  
  
pravastatin (PRAVACHOL) 80 mg tablet Take 80 mg by mouth nightly., Historical Med  
  
cholecalciferol (VITAMIN D3) 2,000 unit cap capsule Take 2,000 Units by mouth daily. , Historical Med  
  
aspirin (ASPIRIN) 325 mg tablet Take 325 mg by mouth daily. , Historical Med  
  
calcium-cholecalciferol, D3, (CALTRATE 600+D) tablet Take 1 Tab by mouth two (2) times a day., Historical Med  
  
peg 400-propylene glycol (SYSTANE, PROPYLENE GLYCOL,) 0.4-0.3 % drop Administer 1 Drop to both eyes two (2) times daily as needed (dry eyes). , Historical Med  
  
metFORMIN (GLUCOPHAGE) 500 mg tablet Take 500 mg by mouth two (2) times daily (with meals). , Historical Med  
  
levothyroxine (SYNTHROID) 125 mcg tablet Take 125 mcg by mouth Daily (before breakfast). , Historical Med  
  
losartan (COZAAR) 100 mg tablet Take 100 mg by mouth daily. , Historical Med  
  
glimepiride (AMARYL) 4 mg tablet Take 4-8 mg by mouth nightly as needed. BS >200, Historical Med 2. Follow-up Information Follow up With Specialties Details Why Contact Info Your gastroenterologist  Schedule an appointment as soon as possible for a visit in 2 days for a recheck \[facility]\"" EMERGENCY DEPT Emergency Medicine Go to If symptoms worsen [address] [facility] 
[phone] Return to ED if worse Diagnosis Clinical Impression: 1. Colitis Deven Jolly.  CORNELIO Cosme

## 2019-04-09 NOTE — ED NOTES
Bedside shift change report given to Rochelle (oncoming nurse) by self (offgoing nurse). Report included the following information SBAR, Kardex, ED Summary and MAR.

## 2019-04-09 NOTE — ED NOTES
Patient in room. Call bell within reach/ Side rails up x2/ Bed in low/locked position/ Oriented to unit. Changed in gown. Blankets provided/Repositioned for Position of Comfort. Explained Plan of care with patient. Patient instructed to call when getting OOB. Will continue to monitor. Placed on continuous 02 monitoring and cycling BP. Placed on cardiac monitor

## 2019-04-10 NOTE — DISCHARGE INSTRUCTIONS
Patient Education     Colitis: Care Instructions  Your Care Instructions  Colitis is the medical term for swelling (inflammation) of the intestine. It can be caused by different things, such as an infection or loss of blood flow in the intestine. Other causes are problems like Crohn's disease or ulcerative colitis. Symptoms may include fever, diarrhea that may be bloody, or belly pain. Sometimes symptoms go away without treatment. But you may need treatment or more tests, such as blood tests or a stool test. Or you may need imaging tests like a CT scan or a colonoscopy. In some cases, the doctor may want to test a sample of tissue from the intestine. This test is called a biopsy. The doctor has checked you carefully, but problems can develop later. If you notice any problems or new symptoms, get medical treatment right away. Follow-up care is a key part of your treatment and safety. Be sure to make and go to all appointments, and call your doctor if you are having problems. It's also a good idea to know your test results and keep a list of the medicines you take. How can you care for yourself at home? · Rest until you feel better. · Your doctor may recommend that you eat bland foods. These include rice, dry toast or crackers, bananas, and applesauce. · To prevent dehydration, drink plenty of fluids. Choose water and other caffeine-free clear liquids until you feel better. If you have kidney, heart, or liver disease and have to limit fluids, talk with your doctor before you increase the amount of fluids you drink. · Be safe with medicines. Take your medicines exactly as prescribed. Call your doctor if you think you are having a problem with your medicine. You will get more details on the specific medicines your doctor prescribes. When should you call for help? Call 911 anytime you think you may need emergency care. For example, call if:  · You passed out (lost consciousness).   · You vomit blood or what looks like coffee grounds. · Your stools are maroon or very bloody. Call your doctor now or seek immediate medical care if:  · You have new or worse pain. · You have a new or higher fever. · You have new or worse symptoms. · You cannot keep fluids or medicines down. Watch closely for changes in your health, and be sure to contact your doctor if:  · You do not get better as expected. Where can you learn more? Go to [employer]  Enter [id number] in the search box to learn more about \"Colitis: Care Instructions. \"   © 9293-3133 [employer]. Care instructions adapted under license by [employer] (which disclaims liability or warranty for this information). This care instruction is for use with your licensed healthcare professional. If you have questions about a medical condition or this instruction, always ask your healthcare professional. [address] any warranty or liability for your use of this information.   Content Version: 05.3.868556; Current as of: November 20, 2015

## 2023-01-25 NOTE — DIABETES MGMT
DTC Progress Note Recommendations/ Comments: Please consider resuming amaryl 4mg ac b and beginning lantus 15 units daily Current hospital DM medication: humalog correction Chart reviewed on Dinora Fernández. Patient is a 77 y.o. female with known Type 2 Diabetes on metformin 850mg ac b/d and amaryl 4mg nightly at home. A1c:  
Lab Results Component Value Date/Time Hemoglobin A1c 7.0 (H) 02/25/2019 03:17 AM  
 
 
Recent Glucose Results:  
Lab Results Component Value Date/Time GLUCPOC 235 (H) 02/26/2019 11:34 AM  
 GLUCPOC 171 (H) 02/26/2019 08:11 AM  
 GLUCPOC 224 (H) 02/25/2019 09:05 PM  
  
 
Lab Results Component Value Date/Time Creatinine 0.70 02/25/2019 03:17 AM  
 
Estimated Creatinine Clearance: 76.3 mL/min (based on SCr of 0.7 mg/dL). Active Orders Diet DIET DIABETIC CONSISTENT CARB Regular PO intake:  
Patient Vitals for the past 72 hrs: 
 % Diet Eaten 02/25/19 1900 80 % 02/25/19 1131 50 % 02/25/19 0800 80 % 02/24/19 1240 75 % 02/24/19 0838 75 % Will continue to follow as needed. Thank you Keila Garcia RD, CDE [facility] Office:  [phone] Time spent: 5 min HR=87 bpm, JMOB=222/91 mmhg, AaJ4=502.0 %, Resp=13 B/min, EtCO2=32 mmHg, Apnea=1 Seconds, Pain=0, Toribio=3

## 2023-10-09 NOTE — PROGRESS NOTES
PCU SHIFT NURSING NOTE Bedside and Verbal shift change report given to Lesia Whitten RN (oncoming nurse) by Riya Mcmahon RN (offgoing nurse). Report included the following information SBAR, Kardex, Intake/Output, MAR, Recent Results and Cardiac Rhythm Afib. Shift Summary:  
 
 
Admission Date 2/22/2019 Admission Diagnosis CHF (congestive heart failure) ([facility] [address] [I50.9] Consults IP CONSULT TO CARDIOLOGY Consults [x]PT [x]OT []Speech [x]Case Management  
  
[] Palliative Cardiac Monitoring Order []Yes []No  
 
IV drips [x]Yes Drip: Cardizem           Dose: 10mg/hr Drip:                            Dose: 
Drip:                            Dose:  
[]No  
 
GI Prophylaxis [x]Yes []No  
 
 
 
DVT Prophylaxis SCDs:  Sequential Compression Device: Bilateral  
     
 Juan stockings:     
  
[] Medication []Contraindicated []None Activity Level Activity Level: Up with Assistance Activity Assistance: Partial (one person) Purposeful Rounding every 1-2 hour? [x]Yes Turner Score  Total Score: 2 Bed Alarm (If score 3 or >) [x]Yes  
[] Refused (See signed refusal form in chart) Fabricio Score  Fabricio Score: 17 Fabricio Score (if score 14 or less) []PMT consult  
[]Wound Care consult []Specialty bed  
[] Nutrition consult Needs prior to discharge:  
Home O2 required:   
[]Yes []No  
 If yes, how much O2 required? Currently on 2L NC O2 Other:  
 Last Bowel Movement: Last Bowel Movement Date: 02/22/19 Influenza Vaccine Received Flu Vaccine for Current Season (usually Sept-March): Yes Pneumonia Vaccine Diet Active Orders Diet DIET DIABETIC CONSISTENT CARB Regular LDAs Peripheral IV 02/22/19 Right Antecubital (Active) Site Assessment Clean, dry, & intact 2/23/2019  7:19 AM  
Phlebitis Assessment 0 2/23/2019  7:19 AM  
Infiltration Assessment 0 2/23/2019  7:19 AM  
 Dressing Status Clean, dry, & intact 2/23/2019  7:19 AM  
Dressing Type Tape;Transparent 2/23/2019  7:19 AM  
Hub Color/Line Status Pink;Capped 2/23/2019  7:19 AM  
Action Taken Blood drawn 2/22/2019  1:15 AM  
   
Peripheral IV 02/22/19 Right;Posterior Wrist (Active) Site Assessment Clean, dry, & intact 2/23/2019  7:19 AM  
Phlebitis Assessment 0 2/23/2019  7:19 AM  
Infiltration Assessment 0 2/23/2019  7:19 AM  
Dressing Status Clean, dry, & intact 2/23/2019  7:19 AM  
Dressing Type Tape;Transparent 2/23/2019  7:19 AM  
Hub Color/Line Status Pink; Infusing 2/23/2019  7:19 AM  
                  
Urinary Catheter Intake & Output Date 02/22/19 0700 - 02/23/19 0889 02/23/19 0700 - 02/24/19 2816 Shift 9958-5756 1079-2583 24 Hour Total 0308-5749 4533-1139 24 Hour Total  
INTAKE  
I.V.(mL/kg/hr)  757.2(0.6) 757.2(0.3) Cardizem Volume  207.2 207.2 Volume (azithromycin (ZITHROMAX) 500 mg in 0.9% sodium chloride (MBP/ADV) 250 mL)  500 500 Volume (cefTRIAXone (ROCEPHIN) 1 g in 0.9% sodium chloride (MBP/ADV) 50 mL)  50 50 Shift Total(mL/kg)  757. 2(7.5) 757. 2(7.5) OUTPUT Urine(mL/kg/hr) 550(0.5)  550(0.2) Urine Voided 550  550 Urine Occurrence(s)  2 x 2 x Shift Total(mL/kg) 550(5.8)  550(5.5) NET -550 757.2 207.2 Weight (kg) 95.3 100.9 100.9 100.9 100.9 100.9 Readmission Risk Assessment Tool Score Low Risk 12 Total Score 3 Has Seen PCP in Last 6 Months (Yes=3, No=0)  
 5 Pt. Coverage (Medicare=5 , Medicaid, or Self-Pay=4) 4 Charlson Comorbidity Score (Age + Comorbid Conditions) Criteria that do not apply:  
 . Living with Significant Other. Assisted Living. LTAC. SNF. or  
Rehab Patient Length of Stay (>5 days = 3) IP Visits Last 12 Months (1-3=4, 4=9, >4=11) Expected Length of Stay 4d 4h Actual Length of Stay 1  
  
 
 
 
 190.5